# Patient Record
Sex: MALE | Race: OTHER | NOT HISPANIC OR LATINO | Employment: FULL TIME | URBAN - METROPOLITAN AREA
[De-identification: names, ages, dates, MRNs, and addresses within clinical notes are randomized per-mention and may not be internally consistent; named-entity substitution may affect disease eponyms.]

---

## 2023-08-14 ENCOUNTER — APPOINTMENT (OUTPATIENT)
Dept: RADIOLOGY | Facility: MEDICAL CENTER | Age: 33
End: 2023-08-14
Attending: PHYSICIAN ASSISTANT
Payer: COMMERCIAL

## 2023-08-14 ENCOUNTER — OFFICE VISIT (OUTPATIENT)
Dept: URGENT CARE | Facility: MEDICAL CENTER | Age: 33
End: 2023-08-14
Payer: COMMERCIAL

## 2023-08-14 VITALS
DIASTOLIC BLOOD PRESSURE: 92 MMHG | RESPIRATION RATE: 18 BRPM | OXYGEN SATURATION: 96 % | HEART RATE: 58 BPM | TEMPERATURE: 97.2 F | SYSTOLIC BLOOD PRESSURE: 122 MMHG

## 2023-08-14 DIAGNOSIS — S86.012A TORN ACHILLES TENDON, LEFT, INITIAL ENCOUNTER: ICD-10-CM

## 2023-08-14 DIAGNOSIS — S99.922A INJURY OF LEFT FOOT, INITIAL ENCOUNTER: ICD-10-CM

## 2023-08-14 DIAGNOSIS — S99.922A INJURY OF LEFT FOOT, INITIAL ENCOUNTER: Primary | ICD-10-CM

## 2023-08-14 PROCEDURE — 73610 X-RAY EXAM OF ANKLE: CPT

## 2023-08-14 PROCEDURE — 99203 OFFICE O/P NEW LOW 30 MIN: CPT | Performed by: FAMILY MEDICINE

## 2023-08-14 PROCEDURE — 73630 X-RAY EXAM OF FOOT: CPT

## 2023-08-14 RX ORDER — INDOMETHACIN 50 MG/1
CAPSULE ORAL
COMMUNITY
Start: 2023-07-04

## 2023-08-14 RX ORDER — ALLOPURINOL 100 MG/1
200 TABLET ORAL
COMMUNITY
Start: 2023-07-04

## 2023-08-14 NOTE — LETTER
August 14, 2023     Patient: Karissa Lincoln   YOB: 1990   Date of Visit: 8/14/2023       To Whom It May Concern: It is my medical opinion that Karissa Lincoln may return to work on 8/17/2023 . If you have any questions or concerns, please don't hesitate to call.          Sincerely,        Eli Krause MD    CC: No Recipients

## 2023-08-14 NOTE — PROGRESS NOTES
[facility]        NAME: Yaz Lundberg is a 34 y.o. male  : 1990    MRN: [id number]  DATE: 2023  TIME: 2:58 PM    Assessment and Plan   Injury of left foot, initial encounter [S99.922A]  1. Injury of left foot, initial encounter  XR ankle 3+ vw left    XR foot 3+ vw left      2. Torn Achilles tendon, left, initial encounter  Ambulatory Referral to Orthopedic Surgery            Patient Instructions       Follow up with PCP in 3-5 days. Proceed to  ER if symptoms worsen. Chief Complaint     Chief Complaint   Patient presents with   • Foot Pain     Pt C/O left ankle/foot pain that started Saturday after being tripped in basketball. History of Present Illness       74-year-old male here today with complaint of left foot/left ankle pain for the last 2 days. He informs the pain developed after he was playing basketball and landed tripping but denies spraining his ankle. He has some pain on weightbearing. He has no significant swelling of the posterior part of his heel calf and ankles. He has had previous ankle sprain but this felt different. Denies any numbness or weakness of the lower extremity. He came to urgent care ambulating with the assistance of crutches from his roommate. Pain is currently 10 out of 10 on weightbearing. Review of Systems   Review of Systems   Musculoskeletal: Positive for arthralgias and joint swelling. Neurological: Negative for numbness.          Current Medications       Current Outpatient Medications:   •  allopurinol (ZYLOPRIM) 100 mg tablet, Take 200 mg by mouth 2 (two) times a day (Patient not taking: Reported on 2023), Disp: , Rfl:   •  indomethacin (INDOCIN) 50 mg capsule, TAKE 1 CAPSULE BY MOUTH TWICE A DAY AS NEEDED FOR PAIN DURING GOUT FLAREUPS (Patient not taking: Reported on 2023), Disp: , Rfl:     Current Allergies     Allergies as of 2023   • (No Known Allergies)            The following portions of the patient's history were reviewed and updated as appropriate: allergies, current medications, past family history, past medical history, past social history, past surgical history and problem list.     No past medical history on file. No past surgical history on file. No family history on file. Medications have been verified. Objective   /92 (BP Location: Right arm, Patient Position: Sitting, Cuff Size: Large)   Pulse 58   Temp (!) 97.2 °F (36.2 °C) (Tympanic)   Resp 18   SpO2 96%   No LMP for male patient. Physical Exam     Physical Exam  Vitals and nursing note reviewed. Constitutional:       Appearance: Normal appearance. Musculoskeletal:         General: Swelling and tenderness present. Comments: Left lower extremity: Decreased range on plantarflexion and dorsiflexion. No pain on inversion or eversion of the left ankle. Significant swelling of the medial and lateral malleolus with ecchymosis bilaterally. Nontender. There is some tenderness over the distal Achilles. There is a depression where the Achilles attaches to the calcaneal bone. Chiu test-positive. Gait-antalgic. Neurological:      Mental Status: He is alert.

## 2023-08-22 ENCOUNTER — ANESTHESIA EVENT (OUTPATIENT)
Dept: PERIOP | Facility: AMBULARY SURGERY CENTER | Age: 33
End: 2023-08-22
Payer: COMMERCIAL

## 2023-08-22 VITALS
SYSTOLIC BLOOD PRESSURE: 130 MMHG | BODY MASS INDEX: 34.36 KG/M2 | DIASTOLIC BLOOD PRESSURE: 89 MMHG | WEIGHT: 240 LBS | HEIGHT: 70 IN | HEART RATE: 70 BPM

## 2023-08-22 DIAGNOSIS — S86.012A TORN ACHILLES TENDON, LEFT, INITIAL ENCOUNTER: Primary | ICD-10-CM

## 2023-08-22 PROCEDURE — 99244 OFF/OP CNSLTJ NEW/EST MOD 40: CPT | Performed by: ORTHOPAEDIC SURGERY

## 2023-08-22 RX ORDER — CHLORHEXIDINE GLUCONATE 0.12 MG/ML
15 RINSE ORAL ONCE
Status: CANCELLED | OUTPATIENT
Start: 2023-08-22 | End: 2023-08-22

## 2023-08-22 RX ORDER — IBUPROFEN 600 MG/1
TABLET ORAL EVERY 6 HOURS PRN
COMMUNITY

## 2023-08-22 RX ORDER — CHLORHEXIDINE GLUCONATE 4 G/100ML
SOLUTION TOPICAL DAILY PRN
Status: CANCELLED | OUTPATIENT
Start: 2023-08-22

## 2023-08-22 NOTE — PROGRESS NOTES
Ivone Altamirano M.D. Attending, Orthopaedic Surgery  Foot and Ankle  [facility]        ORTHOPAEDIC FOOT AND ANKLE CLINIC VISIT-ANKLE FRACTURE     Assessment:     Encounter Diagnosis   Name Primary? • Torn Achilles tendon, left, initial encounter Yes              Plan:   · The patient verbalized understanding of exam findings and treatment plan. We engaged in the shared decision-making process and treatment options were discussed at length with the patient. Surgical and conservative management discussed today along with risks and benefits. · The patient has a left achilles rupture sustained on 8/12/23  · Consent signed in clinic today  · We will plan for surgery at the earliest mutually convenient time. · Xarelto ordered for dvt ppx   · See back 3 weeks postop for suture removal and transition from splint to CAM boot    CONSENT FOR SOFT TISSUE PROCEDURES:   Patient understands that there is no guarantee that the surgery will relieve all of their pain and also understands that there may be a prolonged course of protected weight-bearing status required which will restrict them from driving and other activities as discussed at today's visit. Patient recognizes that there are risks with surgery including bleeding, numbness, nerve irritation, wound complications, infection, continued pain, anesthetic complications, death, failure of procedure and possible need for further surgery. The patient understands that there is no guarantee that this surgery will relieve all of His pain and symptoms. Patient understands that there is no guarantee that they will return to full function after the procedure. Patient has provided informed consent for the procedure.            History of Present Illness:   Chief Complaint:   Chief Complaint   Patient presents with   • Left Ankle - Pain     Patient was playing basketball on 8/12/23 when he collided with another person and fell     Brii Dalton is a 34 y.o. male who is being seen for  left achilles rupture that was sutained while playing basketball on 8/12. Patient states that he felt a pop when he was pushing off to catch the ball. He was seen by urgent care who placed him into a boot. Pain is localized at achilles with minimal radiating and described as sharp and severe. Patient denies numbness, tingling or radicular pain. Denies history of neuropathy. Patient does not smoke, does not have diabetes and does not take blood thinners. Patient denies family history of anesthesia complications and has not had any complications with anesthesia. Pain/symptom timing:  Worse during the day when active  Pain/symptom context:  Worse with activites and work  Pain/symptom modifying factors:  Rest makes better, activities make worse  Pain/symptom associated signs/symptoms: none    Prior treatment   · NSAIDsYes    · Injections No   · Bracing/Orthotics Yes   · Physical Therapy No     Orthopedic Surgical History:   See below    Past Medical, Surgical and Social History:  Past Medical History:  has no past medical history on file. Problem List: does not have a problem list on file. Past Surgical History:  has no past surgical history on file. Family History: family history is not on file. Social History:  has no history on file for tobacco use, alcohol use, and drug use. Current Medications: has a current medication list which includes the following prescription(s): allopurinol and indomethacin. Allergies: has No Known Allergies.      Review of Systems:  General- denies fever/chills  HEENT- denies hearing loss or sore throat  Eyes- denies eye pain or visual disturbances, denies red eyes  Respiratory- denies cough or SOB  Cardio- denies chest pain or palpitations  GI- denies abdominal pain  Endocrine- denies urinary frequency  Urinary- denies pain with urination  Musculoskeletal- Negative except noted above  Skin- denies rashes or wounds  Neurological- denies dizziness or headache  Psychiatric- denies anxiety or difficulty concentrating    Physical Exam:   /89   Pulse 70   Ht 5' 10" (1.778 m)   Wt 109 kg (240 lb)   BMI 34.44 kg/m²   General/Constitutional: No apparent distress: well-nourished and well developed. Eyes: normal ocular motion  Cardio: RRR, Normal S1S2, No m/r/g  Lymphatic: No appreciable lymphadenopathy  Respiratory: Non-labored breathing, CTA b/l no w/c/r  Vascular: No edema, swelling or tenderness, except as noted in detailed exam.  Integumentary: No impressive skin lesions present, except as noted in detailed exam.  Neuro: No ataxia or tremors noted  Psych: Normal mood and affect, oriented to person, place and time. Appropriate affect. Musculoskeletal: Normal, except as noted in detailed exam and in HPI. Examination    left    Gait Not assessed due to unstable ankle fracture   Musculoskeletal Tender to palpation at fracture site    Skin What can be seen in the splint is Normal.      Nails Normal    Range of Motion  Not assessed due to unstable ankle fracture    Stability Unstable    Muscle Strength N/A tibialis anterior  N/A gastrocnemius-soleus  N/A posterior tibialis  N/A peroneal/eversion strength  5/5 EHL  5/5 FHL    Neurologic Normal    Sensation  Intact to light touch throughout sural, saphenous, superficial peroneal, deep peroneal and medial/lateral plantar nerve distributions. [place]-Chris 5.07 filament (10g) testing  deferred. Cardiovascular Brisk capillary refill < 2 seconds,intact DP and PT pulses    Special Tests None      Imaging Studies:   3 views of the  right ankle were taken, reviewed and interpreted independently that demonstrate no acute osseous abnormalities. Reviewed by me personally.     Scribe Attestation    I,:  Deana Grande PA-C am acting as a scribe while in the presence of the attending physician.:       I,:  Raegan Mcclure MD personally performed the services described in this documentation    as scribed in my presence.:             Charliene Rife. Lachman, MD  Foot & Ankle Surgery   Department of [address] [place] [address]      I personally performed the service. Charliene Rife. Lachman, MD

## 2023-08-22 NOTE — H&P (VIEW-ONLY)
Jered Price M.D. Attending, Orthopaedic Surgery  Foot and Ankle  [facility]        ORTHOPAEDIC FOOT AND ANKLE CLINIC VISIT-ANKLE FRACTURE     Assessment:     Encounter Diagnosis   Name Primary? • Torn Achilles tendon, left, initial encounter Yes              Plan:   · The patient verbalized understanding of exam findings and treatment plan. We engaged in the shared decision-making process and treatment options were discussed at length with the patient. Surgical and conservative management discussed today along with risks and benefits. · The patient has a left achilles rupture sustained on 8/12/23  · Consent signed in clinic today  · We will plan for surgery at the earliest mutually convenient time. · Xarelto ordered for dvt ppx   · See back 3 weeks postop for suture removal and transition from splint to CAM boot    CONSENT FOR SOFT TISSUE PROCEDURES:   Patient understands that there is no guarantee that the surgery will relieve all of their pain and also understands that there may be a prolonged course of protected weight-bearing status required which will restrict them from driving and other activities as discussed at today's visit. Patient recognizes that there are risks with surgery including bleeding, numbness, nerve irritation, wound complications, infection, continued pain, anesthetic complications, death, failure of procedure and possible need for further surgery. The patient understands that there is no guarantee that this surgery will relieve all of His pain and symptoms. Patient understands that there is no guarantee that they will return to full function after the procedure. Patient has provided informed consent for the procedure.            History of Present Illness:   Chief Complaint:   Chief Complaint   Patient presents with   • Left Ankle - Pain     Patient was playing basketball on 8/12/23 when he collided with another person and fell     Swathi Tomy is a 34 y.o. male who is being seen for  left achilles rupture that was sutained while playing basketball on 8/12. Patient states that he felt a pop when he was pushing off to catch the ball. He was seen by urgent care who placed him into a boot. Pain is localized at achilles with minimal radiating and described as sharp and severe. Patient denies numbness, tingling or radicular pain. Denies history of neuropathy. Patient does not smoke, does not have diabetes and does not take blood thinners. Patient denies family history of anesthesia complications and has not had any complications with anesthesia. Pain/symptom timing:  Worse during the day when active  Pain/symptom context:  Worse with activites and work  Pain/symptom modifying factors:  Rest makes better, activities make worse  Pain/symptom associated signs/symptoms: none    Prior treatment   · NSAIDsYes    · Injections No   · Bracing/Orthotics Yes   · Physical Therapy No     Orthopedic Surgical History:   See below    Past Medical, Surgical and Social History:  Past Medical History:  has no past medical history on file. Problem List: does not have a problem list on file. Past Surgical History:  has no past surgical history on file. Family History: family history is not on file. Social History:  has no history on file for tobacco use, alcohol use, and drug use. Current Medications: has a current medication list which includes the following prescription(s): allopurinol and indomethacin. Allergies: has No Known Allergies.      Review of Systems:  General- denies fever/chills  HEENT- denies hearing loss or sore throat  Eyes- denies eye pain or visual disturbances, denies red eyes  Respiratory- denies cough or SOB  Cardio- denies chest pain or palpitations  GI- denies abdominal pain  Endocrine- denies urinary frequency  Urinary- denies pain with urination  Musculoskeletal- Negative except noted above  Skin- denies rashes or wounds  Neurological- denies dizziness or headache  Psychiatric- denies anxiety or difficulty concentrating    Physical Exam:   /89   Pulse 70   Ht 5' 10" (1.778 m)   Wt 109 kg (240 lb)   BMI 34.44 kg/m²   General/Constitutional: No apparent distress: well-nourished and well developed. Eyes: normal ocular motion  Cardio: RRR, Normal S1S2, No m/r/g  Lymphatic: No appreciable lymphadenopathy  Respiratory: Non-labored breathing, CTA b/l no w/c/r  Vascular: No edema, swelling or tenderness, except as noted in detailed exam.  Integumentary: No impressive skin lesions present, except as noted in detailed exam.  Neuro: No ataxia or tremors noted  Psych: Normal mood and affect, oriented to person, place and time. Appropriate affect. Musculoskeletal: Normal, except as noted in detailed exam and in HPI. Examination    left    Gait Not assessed due to unstable ankle fracture   Musculoskeletal Tender to palpation at fracture site    Skin What can be seen in the splint is Normal.      Nails Normal    Range of Motion  Not assessed due to unstable ankle fracture    Stability Unstable    Muscle Strength N/A tibialis anterior  N/A gastrocnemius-soleus  N/A posterior tibialis  N/A peroneal/eversion strength  5/5 EHL  5/5 FHL    Neurologic Normal    Sensation  Intact to light touch throughout sural, saphenous, superficial peroneal, deep peroneal and medial/lateral plantar nerve distributions. [place]-Chris 5.07 filament (10g) testing  deferred. Cardiovascular Brisk capillary refill < 2 seconds,intact DP and PT pulses    Special Tests None      Imaging Studies:   3 views of the  right ankle were taken, reviewed and interpreted independently that demonstrate no acute osseous abnormalities. Reviewed by me personally.     Scribe Attestation    I,:  Brigid Saint, PA-C am acting as a scribe while in the presence of the attending physician.:       I,:  Julianne Ferrer MD personally performed the services described in this documentation    as scribed in my presence.:             Derrick Ocampo. Lachman, MD  Foot & Ankle Surgery   Department of [address] [place] [address]      I personally performed the service. Vannie Frisk. Lachman, MD

## 2023-08-22 NOTE — PRE-PROCEDURE INSTRUCTIONS
Pre-Surgery Instructions:   Medication Instructions   • allopurinol (ZYLOPRIM) 100 mg tablet Take night before surgery   • ibuprofen (MOTRIN) 600 mg tablet Follow surgeons instructions   • indomethacin (INDOCIN) 50 mg capsule Uses PRN- DO NOT take day of surgery   • rivaroxaban (Xarelto) 10 mg tablet Post op only      Medication instructions for day surgery reviewed. Please use only a sip of water to take your instructed medications. Avoid all over the counter vitamins, supplements and NSAIDS starting today. Tylenol is ok to take as needed. You will receive a call one business day prior to surgery with an arrival time and hospital directions. If your surgery is scheduled on a Monday, the hospital will be calling you on the Friday prior to your surgery. If you have not heard from anyone by 8pm, please call the hospital supervisor through the hospital  at [phone]. Pablo Miguel [phone]). Do not eat or drink anything after midnight the night before your surgery, including candy, mints, lifesavers, or chewing gum. Do not drink alcohol 24hrs before your surgery. Try not to smoke at least 24hrs before your surgery. Follow the pre surgery showering instructions as listed in the [facility] Surgical Experience Booklet” or otherwise provided by your surgeon's office. Do not shave the surgical area 24 hours before surgery. Do not apply any lotions, creams, including makeup, cologne, deodorant, or perfumes after showering on the day of your surgery. No contact lenses, eye make-up, or artificial eyelashes. Remove nail polish, including gel polish, and any artificial, gel, or acrylic nails if possible. Remove all jewelry including rings and body piercing jewelry. Wear causal clothing that is easy to take on and off. Consider your type of surgery. Keep any valuables, jewelry, piercings at home. Please bring any specially ordered equipment (sling, braces) if indicated.     Arrange for a responsible person to drive you to and from the hospital on the day of your surgery. Visitor Guidelines discussed. Call the surgeon's office with any new illnesses, exposures, or additional questions prior to surgery. Please reference your [facility] Surgical Experience Booklet” for additional information to prepare for your upcoming surgery.

## 2023-08-22 NOTE — PATIENT INSTRUCTIONS
Shannon Elizondo M.D. Attending, [address] [place] [address] Office Phone: [phone] ? Fax: [phone]  [place] Office Phone: [phone] ? EQP:[phone]    : [place]     Surgery Coordinators [facility]: [facility], [address], [phone]  Surgery Coordinator [place]:  Mayela Jiang, [phone]                                                               [email or web address]   PRE-OPERATIVE AND POST-OPERATIVE INSTRUCTIONS    General Information:  Your surgery is with Dr. Josr Gonzalez. Dates can change (although rare) depending on emergencies. Typical post operative visits are at the following intervals:  3 weeks post surgery(except 1 week for bunions and wound monitoring), 6 weeks post surgery, 3 months post surgery, 6 months post surgery, and then on a yearly basis. However, this may change based on Dr. Kamini Gayle recommendation. #1 post-operative rule for foot/ankle surgery:  ONCE YOU ARE OUT OF YOUR CAST AND/OR REMOVABLE BOOT, SWELLING MAY PERSIST FOR MANY MONTHS. YOU MIGHT ALSO EXPERIENCE A BLUISH DISCOLORATION OF YOUR LEG. THIS IS NORMAL AND PART OF THE USUAL POSTOPERATIVE EXPERIENCE. SMOKING:  Smoking results in incomplete healing of fractures (broken bones) and joints that my have been fused. Smoking and nicotine also prevents the growth of bone into ankle replacements and bone healing. It also slows the healing of muscles and skin (soft tissue). Therefore, please do not have surgery if you continue to smoke. We reserve the right to cancel your surgery if we suspect that you are smoking. DO NOT use nicorette gum or other patches. Please find an alternative method to quit smoking before your surgery. Pre-Operative Information:  Surgery date and preoperative visits:   If you have medical problems, such as an abnormal EKG, history of BLOOD CLOT, ANEURYSM, and any other heart condition, please inform us so that we can get your medical clearance several weeks before the surgery. Please bring any important medical information, such as an EKG, chest x-ray, or echocardiogram, with you to ensure that your surgery will not be delayed. If needed, you will receive your preoperative appointments in the mail or by phone from our scheduling office. The location of the preoperative appointment will be given to you also. You may not eat after midnight the night before surgery. If you do, your surgery will be cancelled. You will receive a phone call from your surgery center the day before your surgery (if your surgery is on a Monday, you will get a call the Friday before). If you do not hear from someone by 4pm the day before your surgery, please call the Surgical coordinator (number above) to notify us. Start taking Vitamin D3 4000 units per day and Calcium 1200mg per day immediately. You will continue this until your 3 month post-op visit. These are over the counter and available at all pharmacies and supermarkets. FOR THOSE HAVING SURGERY AT [facility] e WILL NEED CRUTCHES OR A ROLLING WALKER AFTER SURGERY, ASK FOR A PRESCRIPTION FOR THIS FROM OUR OFFICE TODAY. THIS CANNOT BE HANDLED THE DAY OF SURGERY AS [facility] DOES NOT STOCK THESE. Because bacterial can often enter any defect in the skin, it is important to avoid any cuts before surgery. Any breaks in the skin on the leg will often result in your surgery being postponed. Please avoid going on a very long walk the day prior to surgery, or doing other activities that could lead to irritation of the skin, including yard work, extra athletic activity, or shaving. This could result in surgery cancellation. You MUST be fasting the day of your surgery. Therefore, please do not consume any foot or beverage after midnight the night before surgery.   The morning of surgery you may take your usual medications with a sip of water. It is important not to take anti-inflammatory medication like Ibuprofen, Motrin, Naproxen (Aleve), or Aspirin 7-10 days before surgery because they will make you bleed more than usual.  Vitamin, E, Plavix and Coumadin also have the same effect. Stop Aspirin and Vitamin E two weeks before surgery. YOUR MEDICAL DOCTOR SHOULD TELL YOU WHEN TO STOP COUMADIN OR PLAVIX. If your surgery involves any bone healing, please do not take anti-inflammatories for at least 6 weeks after surgery. This can impede bone healing (ibuprofen, Aleve, Relafen, iodine). Tylenol is fine to take. PREOPERATIVE BATHING INSTRUCTIONS:    Before your surgery, bathe with Hibiclens (4% Chlorhexidene) as instructed below. This skin cleanser will help reduce the bacteria on your skin before surgery. To avoid irritating your eyes, do not apply Hibiclens above the level of your neck. On the evening before AND the morning of surgery, bathe your entire body except the face and scalp, then rinse freely. DO NOT apply to your face or scalp, as Hibiclens can irritate your eyes. Purchasing information:   Hibiclens is available without a prescription at [facility]. ADDITIONAL INSTRUCTIONS:  PATIENTS HAVING FOOT/ANKLE SURGERY     In preparation for your upcoming surgery, we kindly request and advise the following:  Notify our office if you are taking any of the following:  Coumadin (warfarin):  Persantine (dipyridamole); Pletal (cilostazol); Plavix (clopidogrel); Ticlid (ticlopidine); Agrylin (anagrelide); Aggrenox (dipyridamole and aspirin) or other blood thinners,. In addition, stop taking Vitamin E and herbal supplements. Do not schedule any elective dental work for at least 6 months after surgery. If you had an ankle replacement, you will need to take antibiotics before any future dental procedures. Your dentist or our office can prescribe these for you.   1000mg of Amoxicillin 1 hour prior to any dental procedure is the recommended dosing. THREE RULES:    After surgery you will most likely be given the instructions “KEEP YOUR TOES ABOVE YOUR NOSE.”  This means that you MUST have your feet elevated higher than your heart. Keeping your toes above your nose helps to heal the muscles and skin (soft tissues) by reducing swelling in your leg. This position also helps to prevent infection, and is very important in avoiding deep venous thrombosis (blood clots). In order to keep the blood circulating in your legs and in order to avoid deep vein   thrombosis (blood clots), we ask patients to GET UP ONCE AN HOUR during the day. This means you should at least cross the room and come back. It does not mean you have to be up for long periods of time. In most cases we will not have people immediately put any weight on their operated part. This is important to prevent loosening of metal or other devices holding the bones together. It also prevents irritation of the soft tissues which can lead to prolonged healing. When we say get up once an hour, please walk, hop or move with an assisted device. This is important! Do not do any excessive walking during the first few days after surgery. Recovering from surgery is a full-time task for the patient. Postoperative care is important to avoid irritating the skin incision, which can lead to infection. Please do not plan activities or go out of town for several weeks after surgery. If you are unsure about your future activities, please schedule surgery only when you know it is acceptable for you. Scheduling surgery and then canceling the date, prevents other people from having surgery on that date as it takes time to line everything up effectively. If you cancel your surgery the week of your planned surgery, we reserve the right to cancel all future surgical procedures.     THE DAY OF SURGERY:    Arrival to the hospital or outpatient surgical center on time is imperative. If you arrive late, then your surgery will be cancelled. You MUST have a family member/friend bring you, stay with you throughout the DURATION of your surgery, and drive you home. You MUST be fasting the day of your surgery. Therefore, do not consume any food or beverage after midnight the night before surgery. At your pre-operative visit with the anesthesia staff, or during your phone screen, a nurse will instruct you what medications you will need to take the day of surgery. MAKE SURE THAT THE PHARMACY LISTED IN THE ELECTRONIC MEDICAL RECORD (EPIC) IS YOUR PREFERRED PHARMACY. For example, if you are staying with family or a friend, and will not be near your preferred pharmacy, YOU MUST, tell the nurses checking you in the day of surgery so that this can be changed in the system. If your prescriptions are sent to a pharmacy, this cannot be changed. AFTER YOUR SURGERY:  Bleeding through the bandage almost always occurs. Do not let this alarm you. Simply add more gauze or a towel, call us, and come in for a dressing change. If you think it is excessive, contact us immediately or go to the local emergency room. Do not get the bandage wet. Showering is possible with plastic protectors. Be very careful, as the bathroom can be wet and slippery. If you do get your dressing wet, it should be changed immediately. Please contact us. ONCE YOUR ARE OUT OF YOUR CAST AND/OR REMOVABLE BOOT, SWELLING MAY PERSIST FOR MANY MONTHS. YOU MIGHT ALSO EXPERIENCE A BLUISH DISCOLORATION OF YOUR LEG. THIS IS NORMAL AND PART OF THE USUAL POSTOPERATIVE EXPERIENCE. WEARING COMPRESSION HOSE (ELASTIC STOCKINGS) CAN HELP AVOID SOME OF THIS SWELLING. DRESSING:   The purpose of the surgical dressing is to keep your wound and the surgical site protected from the environment.   Most dressings contain splints, which help to hold your foot and ankle in a corrected position, and also allow the surgical site to heal properly. Dressings will remain in place and undisturbed until the first postop visit. If you have a drain in place, this will need to be removed in 1-3 days after surgery. The time for the drain to be pulled will be written on your discharge instruction sheet. CAST  INSTRUCTIONS:  You may or may not get a cast following surgery. If you do, pay close attention to the following:    After application of a splint or cast, it is very important to elevate your leg for 24 to 72 hours. The injured area should be elevated well above the heart. Remember “Toes above your Nose”. Rest and elevation greatly reduce pain and speed the healing process by minimizing early swelling. CALL YOUR DOCTORS OFFICE OR VISIT LOCATION EMERGENCY ROOM IF YOU HAVE ANY OF THE FOLLOWING:    Significant increased pain, which may be caused by swelling, and the feeling that the splint or cast is too tight  Numbness and tingling in your hand or foot, which may be caused by too much pressure on the nerves  Burning and stinging, which may be caused by too much pressure on the skin  Excessive swelling below the cast, which may mean the cast is slowing your blood circulation  Loss of active movement of toes, which request an urgent evaluation  Loss of “capillary refill”. Pinch the tip of toes and randa the skin. Release pressure and if the skin does not return pink then call the office immediately. DO NOT GET YOUR CAST WET. Bacteria thrive in moist dark areas. We do not want this. If your cast becomes wet, return to the office and we will apply another one. PAIN AFTER SURGERY:  Narcotic pain medication can and will depress your respiratory system if taken in excess. The goal of pain management with narcotics is to be comfortable not pain free. If you take enough narcotics to be pain free then you run the risk of stopping breathing. If this happens, call 911 immediately!   Pain in the heel is often caused by pressure from the weight of your foot on the bed. Make sure your heel is suspended off the bed by keeping a pillow underneath your calf not your knee. Medications: You will be given narcotic pain medication. Do NOT drive while taking narcotic medications. Medications such as Darvocet, Percocet, Vicoden or Tylenol #3, also contain acetaminophen (Tylenol). Do not take acetaminophen or Tylenol from home when taking theses medications. When you fill your prescription, you may ask the pharmacist if your pain medication has acetaminophen/Tylenol in it. It is okay to take Tylenol with Oxycontin/Oxycodone. Should you have pain after taking your prescription medication, ibuprophen (Motrin, Advil, and Alleve) is a common over the counter preparation and may often be taken with the prescription pain medication as long as you take them with food. These medications can irritate the stomach lining. Unless you are allergic to aspirin or currently taking a blood thinner, Dr. Isidra Young patients are requested to take one 325 mg aspirin every 12 hours until you are back to walking normally after surgery (This can be up to 6 weeks). Narcotic medications commonly cause nausea. Taking them with food will decrease this side effect. If you are having extreme nausea, please contact us for an alternative medication or for something that can be taken with this medication to decrease the nausea. Also, narcotic medications frequently cause constipation. An increase of fiber, fruits and vegetables in your diet may alleviate this problem, or if necessary, you may use an over-the-counter medication such as senekot, colace, or Fibercon for constipation problems. You should resume all medications you were taking prior to the surgery unless otherwise specified. Activity:   Because of your recent foot surgery, your activity level will decrease.  You will need to elevate your foot ABOVE the level of your heart for a minimum of four days. The length of time necessary for the swelling to go down, and for your wounds to heal properly depends greatly on your efforts here. Elevation is extremely important to avoid compromising the blood supply to your foot. Remember when your foot is down it will swell, which will increase pain and slow healing. Wiggle your toes frequently if possible. If you go home with a regional block, (a type of anesthesia) the foot and leg will be numb. Think of ways to get into your house and around the house until the block wears off. Keep in mind that it may be a legal issue if you drive while in a cast or splint, especially when the splint is on the right foot. You may call the [employer] of [employer] to schedule a road test if you have adaptive equipment applied to your car. The amount of weight you are allowed to bear on your foot will be written on your discharge sheet filled out at the time of surgery. The following is an explanation of the possibilities:       [id number] has the following policies when it comes to ELECTIVE surgery  No elective surgery requiring anesthesia until 7 weeks after a patient tested positive for COVID-19   No elective surgery requiring anesthesia until 3 months after a patient was hospitalized for COVID-19      Non-weight bearing: You are to put NO weight whatsoever on your foot. When using crutches or a walker, your foot should not touch the ground, except when you are standing. Then, it may rest on the ground. If you are to be non-weight bearing, and you are not compliant, you could compromise the surgery. Some of our patients have been requesting prescriptions for a roll-a-bout knee scooter. [employer] and other insurances have been denying these claims, and you may either have to rent one or pay out of pocket to purchase one.   THIS SHOULD BE PURCHASED PRIOR TO THE SURGERY AND YOU SHOULD BRING IT WITH YOU THE DAY OF THE SURGERY TO AIDE IN GETTING FROM THE CAR INTO THE HOUSE AFTER SURGERY.

## 2023-08-24 ENCOUNTER — ANESTHESIA (OUTPATIENT)
Dept: PERIOP | Facility: AMBULARY SURGERY CENTER | Age: 33
End: 2023-08-24
Payer: COMMERCIAL

## 2023-08-24 ENCOUNTER — HOSPITAL ENCOUNTER (OUTPATIENT)
Facility: AMBULARY SURGERY CENTER | Age: 33
Setting detail: OUTPATIENT SURGERY
Discharge: HOME | End: 2023-08-24
Attending: ORTHOPAEDIC SURGERY | Admitting: ORTHOPAEDIC SURGERY
Payer: COMMERCIAL

## 2023-08-24 VITALS
HEIGHT: 70 IN | OXYGEN SATURATION: 95 % | TEMPERATURE: 97 F | HEART RATE: 78 BPM | WEIGHT: 243 LBS | BODY MASS INDEX: 34.79 KG/M2 | RESPIRATION RATE: 16 BRPM | SYSTOLIC BLOOD PRESSURE: 133 MMHG | DIASTOLIC BLOOD PRESSURE: 87 MMHG

## 2023-08-24 DIAGNOSIS — S86.012A TORN ACHILLES TENDON, LEFT, INITIAL ENCOUNTER: Primary | ICD-10-CM

## 2023-08-24 PROCEDURE — NC001 PR NO CHARGE

## 2023-08-24 PROCEDURE — 27650 REPAIR ACHILLES TENDON: CPT | Performed by: ORTHOPAEDIC SURGERY

## 2023-08-24 PROCEDURE — C9290 INJ, BUPIVACAINE LIPOSOME: HCPCS | Performed by: ANESTHESIOLOGY

## 2023-08-24 PROCEDURE — C1713 ANCHOR/SCREW BN/BN,TIS/BN: HCPCS | Performed by: ORTHOPAEDIC SURGERY

## 2023-08-24 DEVICE — PARS SUTURE IMPLANT KIT W/ SUTURETAPE
Type: IMPLANTABLE DEVICE | Site: HEEL | Status: FUNCTIONAL
Brand: ARTHREX®

## 2023-08-24 RX ORDER — BUPIVACAINE HYDROCHLORIDE 5 MG/ML
INJECTION, SOLUTION EPIDURAL; INTRACAUDAL
Status: COMPLETED | OUTPATIENT
Start: 2023-08-24 | End: 2023-08-24

## 2023-08-24 RX ORDER — LABETALOL HYDROCHLORIDE 5 MG/ML
5 INJECTION, SOLUTION INTRAVENOUS
Status: DISCONTINUED | OUTPATIENT
Start: 2023-08-24 | End: 2023-08-24 | Stop reason: HOSPADM

## 2023-08-24 RX ORDER — MEPERIDINE HYDROCHLORIDE 25 MG/ML
12.5 INJECTION INTRAMUSCULAR; INTRAVENOUS; SUBCUTANEOUS ONCE
Status: DISCONTINUED | OUTPATIENT
Start: 2023-08-24 | End: 2023-08-24 | Stop reason: HOSPADM

## 2023-08-24 RX ORDER — LIDOCAINE HYDROCHLORIDE 10 MG/ML
INJECTION, SOLUTION EPIDURAL; INFILTRATION; INTRACAUDAL; PERINEURAL AS NEEDED
Status: DISCONTINUED | OUTPATIENT
Start: 2023-08-24 | End: 2023-08-24

## 2023-08-24 RX ORDER — ONDANSETRON 2 MG/ML
INJECTION INTRAMUSCULAR; INTRAVENOUS AS NEEDED
Status: DISCONTINUED | OUTPATIENT
Start: 2023-08-24 | End: 2023-08-24

## 2023-08-24 RX ORDER — HYDROMORPHONE HCL/PF 1 MG/ML
0.5 SYRINGE (ML) INJECTION
Status: DISCONTINUED | OUTPATIENT
Start: 2023-08-24 | End: 2023-08-24 | Stop reason: HOSPADM

## 2023-08-24 RX ORDER — FENTANYL CITRATE/PF 50 MCG/ML
25 SYRINGE (ML) INJECTION
Status: DISCONTINUED | OUTPATIENT
Start: 2023-08-24 | End: 2023-08-24 | Stop reason: HOSPADM

## 2023-08-24 RX ORDER — SODIUM CHLORIDE, SODIUM LACTATE, POTASSIUM CHLORIDE, CALCIUM CHLORIDE 600; 310; 30; 20 MG/100ML; MG/100ML; MG/100ML; MG/100ML
INJECTION, SOLUTION INTRAVENOUS CONTINUOUS PRN
Status: DISCONTINUED | OUTPATIENT
Start: 2023-08-24 | End: 2023-08-24

## 2023-08-24 RX ORDER — OXYCODONE HYDROCHLORIDE 5 MG/1
5 TABLET ORAL ONCE AS NEEDED
Status: DISCONTINUED | OUTPATIENT
Start: 2023-08-24 | End: 2023-08-24 | Stop reason: HOSPADM

## 2023-08-24 RX ORDER — ONDANSETRON 4 MG/1
4 TABLET, FILM COATED ORAL EVERY 8 HOURS PRN
Qty: 10 TABLET | Refills: 0 | Status: SHIPPED | OUTPATIENT
Start: 2023-08-24

## 2023-08-24 RX ORDER — PROPOFOL 10 MG/ML
INJECTION, EMULSION INTRAVENOUS AS NEEDED
Status: DISCONTINUED | OUTPATIENT
Start: 2023-08-24 | End: 2023-08-24

## 2023-08-24 RX ORDER — DEXAMETHASONE SODIUM PHOSPHATE 10 MG/ML
INJECTION, SOLUTION INTRAMUSCULAR; INTRAVENOUS AS NEEDED
Status: DISCONTINUED | OUTPATIENT
Start: 2023-08-24 | End: 2023-08-24

## 2023-08-24 RX ORDER — CHLORHEXIDINE GLUCONATE 4 G/100ML
SOLUTION TOPICAL DAILY PRN
Status: DISCONTINUED | OUTPATIENT
Start: 2023-08-24 | End: 2023-08-24 | Stop reason: HOSPADM

## 2023-08-24 RX ORDER — ONDANSETRON 2 MG/ML
4 INJECTION INTRAMUSCULAR; INTRAVENOUS ONCE AS NEEDED
Status: DISCONTINUED | OUTPATIENT
Start: 2023-08-24 | End: 2023-08-24 | Stop reason: HOSPADM

## 2023-08-24 RX ORDER — PROMETHAZINE HYDROCHLORIDE 25 MG/ML
12.5 INJECTION, SOLUTION INTRAMUSCULAR; INTRAVENOUS ONCE AS NEEDED
Status: DISCONTINUED | OUTPATIENT
Start: 2023-08-24 | End: 2023-08-24 | Stop reason: HOSPADM

## 2023-08-24 RX ORDER — SUCCINYLCHOLINE/SOD CL,ISO/PF 100 MG/5ML
SYRINGE (ML) INTRAVENOUS AS NEEDED
Status: DISCONTINUED | OUTPATIENT
Start: 2023-08-24 | End: 2023-08-24

## 2023-08-24 RX ORDER — OXYCODONE HYDROCHLORIDE 5 MG/1
5 TABLET ORAL EVERY 4 HOURS PRN
Qty: 30 TABLET | Refills: 0 | Status: SHIPPED | OUTPATIENT
Start: 2023-08-24

## 2023-08-24 RX ORDER — ALBUTEROL SULFATE 2.5 MG/3ML
2.5 SOLUTION RESPIRATORY (INHALATION) ONCE AS NEEDED
Status: DISCONTINUED | OUTPATIENT
Start: 2023-08-24 | End: 2023-08-24 | Stop reason: HOSPADM

## 2023-08-24 RX ORDER — FENTANYL CITRATE 50 UG/ML
INJECTION, SOLUTION INTRAMUSCULAR; INTRAVENOUS AS NEEDED
Status: DISCONTINUED | OUTPATIENT
Start: 2023-08-24 | End: 2023-08-24

## 2023-08-24 RX ORDER — MAGNESIUM HYDROXIDE 1200 MG/15ML
LIQUID ORAL AS NEEDED
Status: DISCONTINUED | OUTPATIENT
Start: 2023-08-24 | End: 2023-08-24 | Stop reason: HOSPADM

## 2023-08-24 RX ORDER — CHLORHEXIDINE GLUCONATE 0.12 MG/ML
15 RINSE ORAL ONCE
Status: DISCONTINUED | OUTPATIENT
Start: 2023-08-24 | End: 2023-08-24 | Stop reason: HOSPADM

## 2023-08-24 RX ORDER — SODIUM CHLORIDE, SODIUM LACTATE, POTASSIUM CHLORIDE, CALCIUM CHLORIDE 600; 310; 30; 20 MG/100ML; MG/100ML; MG/100ML; MG/100ML
125 INJECTION, SOLUTION INTRAVENOUS CONTINUOUS
Status: DISCONTINUED | OUTPATIENT
Start: 2023-08-24 | End: 2023-08-24 | Stop reason: HOSPADM

## 2023-08-24 RX ORDER — CEFAZOLIN SODIUM 2 G/50ML
2000 SOLUTION INTRAVENOUS ONCE
Status: COMPLETED | OUTPATIENT
Start: 2023-08-24 | End: 2023-08-24

## 2023-08-24 RX ORDER — ROCURONIUM BROMIDE 10 MG/ML
INJECTION, SOLUTION INTRAVENOUS AS NEEDED
Status: DISCONTINUED | OUTPATIENT
Start: 2023-08-24 | End: 2023-08-24

## 2023-08-24 RX ORDER — KETOROLAC TROMETHAMINE 30 MG/ML
INJECTION, SOLUTION INTRAMUSCULAR; INTRAVENOUS AS NEEDED
Status: DISCONTINUED | OUTPATIENT
Start: 2023-08-24 | End: 2023-08-24

## 2023-08-24 RX ORDER — VANCOMYCIN HYDROCHLORIDE 1 G/20ML
INJECTION, POWDER, LYOPHILIZED, FOR SOLUTION INTRAVENOUS AS NEEDED
Status: DISCONTINUED | OUTPATIENT
Start: 2023-08-24 | End: 2023-08-24 | Stop reason: HOSPADM

## 2023-08-24 RX ADMIN — ROCURONIUM BROMIDE 40 MG: 10 INJECTION, SOLUTION INTRAVENOUS at 13:26

## 2023-08-24 RX ADMIN — BUPIVACAINE 15 ML: 13.3 INJECTION, SUSPENSION, LIPOSOMAL INFILTRATION at 12:42

## 2023-08-24 RX ADMIN — SUGAMMADEX 200 MG: 100 INJECTION, SOLUTION INTRAVENOUS at 14:04

## 2023-08-24 RX ADMIN — CEFAZOLIN SODIUM 2000 MG: 2 SOLUTION INTRAVENOUS at 13:13

## 2023-08-24 RX ADMIN — BUPIVACAINE HYDROCHLORIDE 10 ML: 5 INJECTION, SOLUTION EPIDURAL; INTRACAUDAL at 12:47

## 2023-08-24 RX ADMIN — DEXAMETHASONE SODIUM PHOSPHATE 10 MG: 10 INJECTION, SOLUTION INTRAMUSCULAR; INTRAVENOUS at 13:24

## 2023-08-24 RX ADMIN — BUPIVACAINE HYDROCHLORIDE 15 ML: 5 INJECTION, SOLUTION EPIDURAL; INTRACAUDAL; PERINEURAL at 12:42

## 2023-08-24 RX ADMIN — SODIUM CHLORIDE, SODIUM LACTATE, POTASSIUM CHLORIDE, AND CALCIUM CHLORIDE: .6; .31; .03; .02 INJECTION, SOLUTION INTRAVENOUS at 12:45

## 2023-08-24 RX ADMIN — Medication 140 MG: at 13:17

## 2023-08-24 RX ADMIN — ONDANSETRON 4 MG: 2 INJECTION INTRAMUSCULAR; INTRAVENOUS at 13:24

## 2023-08-24 RX ADMIN — BUPIVACAINE 5 ML: 13.3 INJECTION, SUSPENSION, LIPOSOMAL INFILTRATION at 12:47

## 2023-08-24 RX ADMIN — KETOROLAC TROMETHAMINE 30 MG: 30 INJECTION, SOLUTION INTRAMUSCULAR; INTRAVENOUS at 14:00

## 2023-08-24 RX ADMIN — FENTANYL CITRATE 100 MCG: 50 INJECTION INTRAMUSCULAR; INTRAVENOUS at 13:14

## 2023-08-24 RX ADMIN — LIDOCAINE HYDROCHLORIDE 50 MG: 10 INJECTION, SOLUTION EPIDURAL; INFILTRATION; INTRACAUDAL; PERINEURAL at 13:17

## 2023-08-24 RX ADMIN — PROPOFOL 200 MG: 10 INJECTION, EMULSION INTRAVENOUS at 13:17

## 2023-08-24 NOTE — OP NOTE
OPERATIVE REPORT  PATIENT NAME: Tina Tovar    :  1990  MRN: [id number]  Pt Location: [facility] OR [facility] 06    SURGERY DATE: 2023    Surgeon(s) and Role:     * Abdulkadir Rodriguez MD - Primary     * Kiah Rocha PA-C - Gabe Rubio MD- assisting    Preop Diagnosis:  Torn Achilles tendon, left, initial encounter [S86.012A]    Post-Op Diagnosis Codes:     * Torn Achilles tendon, left, initial encounter [S86.012A]    Procedure(s):  Left - REPAIR TENDON ACHILLES    Specimen(s):  * No specimens in log *    Estimated Blood Loss:   Minimal    Drains:  * No LDAs found *    Anesthesia Type:   Choice    Operative Indications: Torn Achilles tendon, left, initial encounter [S86.012A]      Operative Findings:  Consistent with diagnosis    Complications:   None    Procedure and Technique:  1. Mini-open achilles tendon repair. 2. Partial fasciotomy deep posterior compartment fascia  3. Placement into short leg nonweightbearing plaster splint    The patient was placed prone and all bony prominences were padded. A thigh tourniquet was placed. An esmarch was used to exsanguinate the leg and the tourniquet was inflated to 250mm Hg. See anesthesia documentation for tourniquet time. Attention was turned to the central achilles. A longitudinal, 2 cm incision was made over the palpable gap. Sharp dissection was taken through the skin and bovie electrocautery was used to control hemostasis. Scissor dissection was used for the deep dissection. Next, a midline incision was made in the paratenon which was still intact which exposed the achilles tendon rupture. We exposed the proximal and distal segments. We used a freer to free any adhesions of the paratenon to the tendon. We then retracted the tendon and exposed the fascia of the posterior compartment. We released this fascia to allow blood supply from this compartment to aid in the supply of the achilles repair site.     We the placed the PARS jig proximally inside the paratenon while using counter-traction on the tendon with an clamp. We began passing the suture #1-5 sequentially, percutaneously through the tendon. After we passed the last suture, we pulled the PARS jig out of the wound and all 5 sutures followed on both sides. We did the same thing distally, passing all five sutures sequentially through the jig and then removing the jig with the sutures now inside the paratenon to prevent sural nerve entrapment or injury. We then used the described technique to lock the blue suture as described in the operations technique guide. We confirmed by pulling on the suture that each had great purchase in the substance of the tendon and then began tieing them to each corresponding suture. The foot was held in plantarflexion to allow good contact between the two ends of the tendon. We the used an 0-vicryl to oversew the tendon to give added strength to the repair. We confirmed restoration of resting equinus tone and had a return of the [place] test.    Copious irrigation was used. Vancomycin powder was used in the wound bed. Next, the paratenon was closed with a running 2-0 vicryl suture. 4-0 vicryl was used subcutaneously and 3-0 nylon was used for the skin. Sterile dressings were applied and the leg was splinted using a posterior-U. I was present for the entire procedure     I was present for the entire procedure.     Patient Disposition:  PACU         SIGNATURE: Araceils Potts MD  DATE: August 24, 2023  TIME: 1:18 PM

## 2023-08-24 NOTE — INTERVAL H&P NOTE
H&P reviewed. After examining the patient I find no changes in the patients condition since the H&P had been written. There were no vitals filed for this visit. Repair left achilles tendon.

## 2023-08-24 NOTE — DISCHARGE INSTR - AVS FIRST PAGE
Marley Belcher M.D. Attending, [address] [place] [address] Office Phone: [phone] ? Fax: [phone] [address] [facility] Office Phone: [phone] ? VYF:[phone]    : Emilie Casanova     Surgery Coordinators [facility]: Evitamaciej Norris, [phone]  Haylee Aparicio, [phone]  Surgery Coordinator [place]:  [place], [phone]                                                              [email or web address]   POST-OPERATIVE INSTRUCTIONS    General Information:  Typical post operative visits are at the following intervals:  2-3 weeks post surgery, 6 weeks post surgery, 3 months post surgery, 6 months post surgery, and then on a yearly basis. However, this may change based on Dr. Toro Kwon recommendation. #1 post-operative rule for foot/ankle surgery:  ONCE YOU ARE OUT OF YOUR CAST AND/OR REMOVABLE BOOT, SWELLING MAY PERSIST FOR MANY MONTHS. YOU MIGHT ALSO EXPERIENCE A BLUISH DISCOLORATION OF YOUR LEG. THIS IS NORMAL AND PART OF THE USUAL POSTOPERATIVE EXPERIENCE. DO NOT WAIT UNTIL YOUR BLOCK WEARS OFF TO TAKE YOUR PAIN MEDICATION. IT TAKES A FEW DOSES OF THE PAIN MEDICATION TO REACH A THERAPEUTIC LEVEL. TAKE A TABLET PROACTIVELY BEFORE YOU HAVE ANY PAIN AND AGAIN 4 HOURS LATER SO WHEN THE BLOCK WEARS OFF, YOU ARE NOT CAUGHT OFF GUARD. SMOKING:  Smoking results in incomplete healing of fractures (broken bones) and joints that my have been fused. Smoking and nicotine also prevents the growth of bone into ankle replacements and bone healing. It also slows the healing of muscles and skin (soft tissue). Therefore, please do not have surgery if you continue to smoke. We reserve the right to cancel your surgery if we suspect that you are smoking. DO NOT use nicorette gum or other patches.   Please find an alternative method to quit smoking before your surgery and do not restart after surgery to allow for healing. THREE RULES:    After surgery you will most likely be given the instructions “KEEP YOUR TOES ABOVE YOUR NOSE.”  This means that you MUST have your feet elevated higher than your heart. Keeping your toes above your nose helps to heal the muscles and skin (soft tissues) by reducing swelling in your leg. This position also helps to prevent infection, and is very important in avoiding deep venous thrombosis (blood clots). In order to keep the blood circulating in your legs and in order to avoid deep vein   thrombosis (blood clots), we ask patients to GET UP ONCE AN HOUR during the day. This means you should at least cross the room and come back. It does not mean you have to be up for long periods of time. In most cases we will not have people immediately put any weight on their operated part. This is important to prevent loosening of metal or other devices holding the bones together. It also prevents irritation of the soft tissues which can lead to prolonged healing. When we say get up once an hour, please walk, hop or move with an assisted device. This is important! Do not do any excessive walking during the first few days after surgery. Recovering from surgery is a full-time task for the patient. Postoperative care is important to avoid irritating the skin incision, which can lead to infection. Please do not plan activities or go out of town for several weeks after surgery. AFTER YOUR SURGERY:  Bleeding through the bandage almost always occurs. Do not let this alarm you. Simply overwrap with an ABD pad and Ace bandage (The nurses discharging your from the day of surgery will provide this.)   If you think it is excessive, you can come in early for a dressing change (at around 1 week instead of 3 weeks postop.)    Do not get the bandage wet. Showering is possible with plastic protectors. Be very careful, as the bathroom can be wet and slippery.   If you do get your dressing wet, it should be changed immediately. Please contact us. ONCE YOUR ARE OUT OF YOUR CAST AND/OR REMOVABLE BOOT, SWELLING MAY PERSIST FOR MANY MONTHS. THERE WILL ALSO BE A BLUISH DISCOLORATION OF YOUR LEG FOR MONTHS. THIS IS NORMAL AND PART OF THE USUAL POSTOPERATIVE EXPERIENCE. WEARING COMPRESSION HOSE (ELASTIC STOCKINGS) CAN HELP AVOID SOME OF THIS SWELLING. Ice the area 20 minutes every hour once the nerve block wears off. If you are in a cast or a splint, you may need to leave the ice on longer than 20 minutes in order to feel any benefits. DRESSING:   The purpose of the surgical dressing is to keep your wound and the surgical site protected from the environment. Most dressings contain splints, which help to hold your foot and ankle in a corrected position, and also allow the surgical site to heal properly. If you have a drain in place, this will need to be removed in 1 day after surgery. The time for the drain to be pulled will be written on your discharge instruction sheet. CAST  INSTRUCTIONS:  You may or may not get a cast following surgery. If you do, pay close attention to the following:    After application of a splint or cast, it is very important to elevate your leg for 24 to 72 hours. The injured area should be elevated well above the heart. Remember “Toes above your Nose”. Rest and elevation greatly reduce pain and speed the healing process by minimizing early swelling.     CALL YOUR DOCTORS OFFICE OR VISIT LOCATION EMERGENCY ROOM IF YOU HAVE ANY OF THE FOLLOWING:    Significant increased pain, which may be caused by swelling (Strict elevation will alleviate this)  Numbness and tingling in your hand or foot, which may be caused by too much pressure on the nerves (There is always some numbness after surgery due to nerve blocks)  Burning and stinging, which may be caused by too much pressure on the skin  Excessive swelling below the cast, which may mean the cast is slowing your blood circulation  Loss of active movement of toes, which request an urgent evaluation  Loss of “capillary refill”. Pinch the tip of toes and randa the skin. Release pressure and if the skin does not return pink then call the office immediately. DO NOT GET YOUR CAST WET. Bacteria thrive in moist dark areas. We do not want this. If your cast becomes wet, return to the office and we will apply another one. PAIN AFTER SURGERY:  Narcotic pain medication can and will depress your respiratory system if taken in excess. The goal of pain management with narcotics is to be comfortable not pain free. If you take enough narcotics to be pain free then you run the risk of stopping breathing. If this happens, call 911 immediately! Pain in the heel is often  caused by pressure from the weight of your foot on the bed. Make sure your heel is suspended off the bed by keeping a pillow underneath your calf not your knee. Medications: You will be given narcotic pain medication. Do NOT drive while taking narcotic medications. Medications such as Darvocet, Percocet, Vicoden or Tylenol #3, also contain acetaminophen (Tylenol). Do not take acetaminophen or Tylenol from home when taking theses medications. When you fill your prescription, you may ask the pharmacist if your pain medication has acetaminophen/Tylenol in it. It is okay to take Tylenol with Oxycontin/Oxycodone. Start Xarelto for blood clot prevention  Narcotic medications commonly cause nausea. Taking them with food will decrease this side effect. If you are having extreme nausea, please contact us for an alternative medication or for something that can be taken with this medication to decrease the nausea. Also, narcotic medications frequently cause constipation.  An increase of fiber, fruits and vegetables in your diet may alleviate this problem, or if necessary, you may use an over-the-counter medication such as senekot, colace, or Jessica Brown for constipation problems. You should resume all medications you were taking prior to the surgery unless otherwise specified. If you had fracture surgery, bony surgery like an osteotomy or fusion, or a surgery that requires bone healing, you are advised to take Vitamin D and Calcium to improve healing potential.  Vitamin D3 4000 units/day and Calcium 1200mg/day. These are over the counter medications so please pick them up at the pharmacy when you are picking up your prescriptions. Activity:   Because of your recent foot surgery, your activity level will decrease. You will need to elevate your foot ABOVE the level of your heart for a minimum of four days. The length of time necessary for the swelling to go down, and for your wounds to heal properly depends greatly on your efforts here. Elevation is extremely important to avoid compromising the blood supply to your foot. Remember when your foot is down it will swell, which will increase pain and slow healing. Wiggle your toes frequently if possible. If you go home with a regional block, (a type of anesthesia) the foot and leg will be numb. Think of ways to get into your house and around the house until the block wears off. Keep in mind that it may be a legal issue if you drive while in a cast or splint, especially when the splint is on the right foot. You may call the Department of Motor Vehicles to schedule a road test if you have adaptive equipment applied to your car. The amount of weight you are allowed to bear on your foot will be written on your discharge sheet filled out at the time of surgery. The following is an explanation of the possibilities:     Non-weight bearing: You are to put NO weight whatsoever on your foot. When using crutches or a walker, your foot should not touch the ground, except when you are standing. Then, it may rest on the ground.  If you are to be non-weight bearing, and you are not compliant, you could compromise the surgery. Some of our patients have been requesting prescriptions for a roll-a-bout knee scooter. [facility] and other insurances have been denying these claims, and you may either have to rent one or pay out of pocket to purchase one.

## 2023-08-24 NOTE — ANESTHESIA PREPROCEDURE EVALUATION
Procedure:  REPAIR TENDON ACHILLES (Left: Foot)    Relevant Problems   No relevant active problems        Physical Exam    Airway    Mallampati score: I  TM Distance: >3 FB  Neck ROM: full     Dental   No notable dental hx     Cardiovascular      Pulmonary      Other Findings        Anesthesia Plan  ASA Score- 2     Anesthesia Type- general with ASA Monitors. Additional Monitors:   Airway Plan: ETT. Comment: Patient seen and examined. History reviewed. Patient to be done under general anesthesia with LMA and routine monitors. Popliteal nerve block and adductor canal block to be performed preoperatively for post-op pain. Risks discussed with the patient. Consent obtained. .       Plan Factors-Exercise tolerance (METS): >4 METS. Chart reviewed. Patient summary reviewed. Induction- intravenous. Postoperative Plan- Plan for postoperative opioid use. Planned trial extubation    Informed Consent- Anesthetic plan and risks discussed with patient. I personally reviewed this patient with the CRNA. Discussed and agreed on the Anesthesia Plan with the CRNA. iLliana Ely

## 2023-08-24 NOTE — ANESTHESIA PROCEDURE NOTES
Peripheral Block    Patient location during procedure: holding area  Start time: 8/24/2023 12:40 PM  Reason for block: at surgeon's request and post-op pain management  Staffing  Performed by: Christie Marquez MD  Authorized by: Christie Marquez MD    Preanesthetic Checklist  Completed: patient identified, IV checked, site marked, risks and benefits discussed, surgical consent, monitors and equipment checked, pre-op evaluation and timeout performed  Peripheral Block  Patient position: right lateral  Prep: ChloraPrep  Patient monitoring: frequent blood pressure checks, continuous pulse oximetry and heart rate  Block type: Popliteal  Laterality: left  Injection technique: single-shot  Procedures: ultrasound guided, Ultrasound guidance required for the procedure to increase accuracy and safety of medication placement and decrease risk of complications.   Ultrasound permanent image savedbupivacaine (PF) (MARCAINE) 0.5 % injection 20 mL - Perineural   15 mL - 8/24/2023 12:42:00 PM  Needle  Needle type: Stimuplex   Needle gauge: 20 G  Needle length: 4 in  Needle localization: anatomical landmarks and ultrasound guidance  Needle insertion depth: 4 cm  Assessment  Injection assessment: incremental injection, frequent aspiration, injected with ease, negative aspiration, negative for heart rate change, no paresthesia on injection, no symptoms of intraneural/intravenous injection and needle tip visualized at all times  Paresthesia pain: none  Post-procedure:  site cleaned  patient tolerated the procedure well with no immediate complications

## 2023-08-24 NOTE — ANESTHESIA PROCEDURE NOTES
Peripheral Block    Patient location during procedure: holding area  Start time: 8/24/2023 12:43 PM  Reason for block: at surgeon's request and post-op pain management  Staffing  Performed by: Royal Monzon MD  Authorized by: Royal Monzon MD    Preanesthetic Checklist  Completed: patient identified, IV checked, site marked, risks and benefits discussed, surgical consent, monitors and equipment checked, pre-op evaluation and timeout performed  Peripheral Block  Prep: ChloraPrep  Patient monitoring: frequent blood pressure checks, continuous pulse oximetry and heart rate  Block type: Adductor Canal  Laterality: left  Injection technique: single-shot  Procedures: ultrasound guided, Ultrasound guidance required for the procedure to increase accuracy and safety of medication placement and decrease risk of complications.   Ultrasound permanent image savedbupivacaine (PF) (MARCAINE) 0.5 % injection 20 mL - Perineural   10 mL - 8/24/2023 12:47:00 PM  Needle  Needle type: Stimuplex   Needle gauge: 20 G  Needle length: 4 in  Needle localization: anatomical landmarks and ultrasound guidance  Needle insertion depth: 5 cm  Assessment  Injection assessment: incremental injection, frequent aspiration, injected with ease, negative aspiration, negative for heart rate change, no paresthesia on injection, no symptoms of intraneural/intravenous injection and needle tip visualized at all times  Paresthesia pain: none  Post-procedure:  site cleaned  patient tolerated the procedure well with no immediate complications

## 2023-08-24 NOTE — ANESTHESIA POSTPROCEDURE EVALUATION
Post-Op Assessment Note    CV Status:  Stable  Pain Score: 0    Pain management: adequate     Mental Status:  Alert and awake   Hydration Status:  Euvolemic   PONV Controlled:  Controlled   Airway Patency:  Patent   Two or more mitigation strategies used for obstructive sleep apnea   Post Op Vitals Reviewed: Yes      Staff: CRNA         No notable events documented.     BP   156/74   Temp   97.6   Pulse 83   Resp 14   SpO2 98

## 2023-09-13 ENCOUNTER — OFFICE VISIT (OUTPATIENT)
Dept: OBGYN CLINIC | Facility: CLINIC | Age: 33
End: 2023-09-13

## 2023-09-13 VITALS — HEIGHT: 70 IN | BODY MASS INDEX: 34.79 KG/M2 | WEIGHT: 243 LBS

## 2023-09-13 DIAGNOSIS — S86.012A TORN ACHILLES TENDON, LEFT, INITIAL ENCOUNTER: Primary | ICD-10-CM

## 2023-09-13 PROCEDURE — 99024 POSTOP FOLLOW-UP VISIT: CPT | Performed by: ORTHOPAEDIC SURGERY

## 2023-09-13 NOTE — PROGRESS NOTES
Elenita Sims M.D. Attending, Orthopaedic Surgery  Foot and Ankle  [facility]      ORTHOPAEDIC FOOT AND ANKLE POST-OP VISIT     Procedure:      Left achilles rupture repair       Date of surgery:   8/22/23      PLAN  1. Weightbearing Status - WBAT operative extremity with heel wedges  2. DVT prophylaxis - Xarelto 10mg Daily  3. Begin PT/HEP as directed  4. Pain control - OTC pain medication  5. RTC in 3 week(s)  6. Xrays needed next visit - No    History of Present Illness:   Chief Complaint:   Chief Complaint   Patient presents with   • Left Ankle - Post-op     Lucero Ibarra is a 34 y.o. male who is being seen for 3 week post-operative visit for the above procedure. Pain is well controlled and the patient has successfully transitioned to OTC pain medicines. he is taking Xarelto 10mg Daily for DVT prophylaxis. Patient has been NWB in a Splint in plantarflexion. Review of Systems:  General- denies fever/chills  Respiratory- denies cough or SOB  Cardio- denies chest pain or palpitations  GI- denies abdominal pain  Musculoskeletal- Negative except noted above  Skin- denies rashes or wounds    Physical Exam:   Ht 5' 10" (1.778 m)   Wt 110 kg (243 lb)   BMI 34.87 kg/m²   General/Constitutional: No apparent distress: well-nourished and well developed. Eyes: normal ocular motion  Lymphatic: No appreciable lymphadenopathy  Respiratory: Non-labored breathing  Vascular: No edema, swelling or tenderness, except as noted in detailed exam.  Integumentary: No impressive skin lesions present, except as noted in detailed exam.  Neuro: No ataxia or tremors noted  Psych: Normal mood and affect, oriented to person, place and time. Appropriate affect. Musculoskeletal: Normal, except as noted in detailed exam and in HPI.     Examination    left        Incision Clean, dry, intact  Sutures Removed this visit    Ecchymosis none    Swelling mild    Sensation Intact to light touch throughout sural, saphenous, superficial peroneal, deep peroneal and medial/lateral plantar nerve distributions. [place]-Chris 5.07 filament (10g) testing deferred. Cardiovascular Brisk capillary refill < 2 seconds,intact DP and PT pulses    Special Tests None      Imaging Studies:   No new images      Scribe Attestation    I,:  Rory Gtz PA-C am acting as a scribe while in the presence of the attending physician.:       I,:  Rom Lincoln MD personally performed the services described in this documentation    as scribed in my presence.:              Carolee Christian. Lachman, MD  Foot & Ankle Surgery   Department of [address] [place] [address]      I personally performed the service. Carolee Christian. Lachman, MD

## 2023-09-13 NOTE — PATIENT INSTRUCTIONS
Continue aspirin/lovenox/xarelto for blood clot prevention  May shower, do not soak in a tub/pool/ocean/etc for another 4 weeks. Begin PT  Scar massage- pea sized amount of lotion, massage into scar for 5 minutes each day. Compression stocking (Knee high, 20-30mm Hg) to be worn at all times while awake. Wear the boot at all times except when showering and in PT, even to sleep at night. Achilles Rupture - Rehab Protocol     OVERVIEW:  Week 0: Surgery  Avoid prolonged dependent position (keep foot elevated)  Week 3: CAM walker boot with 3 heel wedges, weightbearing as tolerated. remove one wedge each week (so down to 2 wedges at week 5, 1 wedge at week 6, 0 wedges at week 7). Begin PT     Week 8: continue PT, transition to shoe with heel lift, wean off crutches  PT 2-3 times/week and home exercises daily  Progress with PT over ~4 months  Week 12: wean off of shoe lift  Week 20 / 5 months: gentle jog  Week 24 / 6 months:  Gradual return to sports as tolerated     DETAILED PHYSICAL THERAPY PROTOCOL:  Initial Phase  Begin gentle ROM, edema control, and progress to gentle strengthening as tolerated.   No dorsiflexion past neutral.     Phase I: weeks 6 - 10  Goals:  Normalize gait  Progress range of motion  Normalize dorsiflexion, inversion, and eversion ankle strength 5/5  Treatment Recommendations:  Gait training, wean off crutches/cane when gait non-antalgic  Heel lift in shoe to assist non-apprehensive and normalized gait  AROM dorsiflexion/plantarflexion/inversion/eversion  Proprioception training  Isometrics/isotonics: inversion/eversion  Sitting heel rise  PREs plantarflexion/dorsiflexion with knee flexed to 90; after 2 weeks, progress to PREs with knee extended to 0  Leg press  Bike  Alphabet drawing  Retro treadmill  Forward step up program  Underwater treadmill system for gait training  Precautions:  Avoid passive heel cord stretching  Minimum Criteria for Advancement:  Normal gait pattern  Manual muscle grade test of 5/5 for dorsiflexion, inversion, and eversion     Phase II: weeks 10 - 20  Goals:  Restore full functional range of motion  Normalize plantarflexion strength 5/5  Normalize balance  Return to functional activities without pain  Ability to descend stairs  Treatment Recommendations:  Submaximal sport-specific skill development  Proprioception training: BAPS, prop board, foam rollers, trampoline, [employer]  Isometrics/isotonics: inversion/eversion  Isokinetic plantarflexion/dorsiflexion  Standing heel rise  Aggressive PREs plantarflexion  Progress proximal strengthening (PREs)  Jamilah Chapman Versaclimber  Forward step down program  Running in underwater treadmill system  Precautions:  Avoid pain with therapeutic exercise and functional activities  Avoid high loading the Achilles tendon (i.e. aggressive stretching in dorsiflexion with body weight or jumping)  Minimum Criteria for Advancement:  No apprehension with activities of daily living  Normal flexibility  Adequate strength base shown by ability to perform ten unilateral heel raises  Ability to descend stairs reciprocally  Symmetrical lower extremity balance     Phase III: weeks 20 - 28  Goals:  Demonstrate ability to run forward on a treadmill symptom-free  Average peak torque of 75% with isokinetic testing  Maximize strength and flexibility as to meet all demands of ADLs  Return to functional activity without limitation  Higher level of dynamic activity with lack of apprehension with sport-specific movements  Treatment Recommendations:  Start forward treadmill running  Isokinetic testing and training  Continue lower extremity strengthening and flexibility program  Advance proprioception training with perturbation  Light plyometric training (bilateral jumping activities)  Continue aggressive plantarflexion PREs (emphasize eccentric activity)  Submaximal sport specific skill development drills  Progress Jamilah chapman Versaclimber  Continue to progress proximal strengthening of lower extremities (PREs)  Precautions:  No apprehension or pain with dynamic activity  Avoid running or sport activity until adequate strength and flexibility is achieved  Minimum Criteria for Advancement:  Pain free running  Average peak torque of isokinetic test = 75% of non-involved  Normal strength (5/5 throughout ankle)  Sports-specific drills with zero apprehension     Phase IV: Return to Sport (weeks 28 - one year)  Goals:  Lack of apprehension with sports activity  Maximize strength and flexibility as to meet demands of individual's sport activity  Treatment Recommendations:  Advanced functional exercises and agility exercises  Plyometrics  Sport-specific exercises  Isokinetic testing  Functional test assessment (such as vertical jump test)  Precautions:  Avoid pain with therapeutic, functional, and sport activity  Avoid full sport activity until adequate strength and flexibility  Criteria for Discharge:  Flexibility and strength to accepted levels for sports performance  Lack of apprehension with sport-specific movements  85% limb symmetry with vertical jump test  85% limb symmetry for average peak isokinetic torque (PF/DF/inv/ev)  Independent performance of gym/home exercise program

## 2023-09-27 ENCOUNTER — EVALUATION (OUTPATIENT)
Dept: PHYSICAL THERAPY | Facility: MEDICAL CENTER | Age: 33
End: 2023-09-27
Attending: ORTHOPAEDIC SURGERY
Payer: COMMERCIAL

## 2023-09-27 DIAGNOSIS — S86.012A TORN ACHILLES TENDON, LEFT, INITIAL ENCOUNTER: ICD-10-CM

## 2023-09-27 PROCEDURE — 97161 PT EVAL LOW COMPLEX 20 MIN: CPT | Performed by: PHYSICAL THERAPIST

## 2023-09-27 PROCEDURE — 97110 THERAPEUTIC EXERCISES: CPT | Performed by: PHYSICAL THERAPIST

## 2023-09-27 NOTE — PROGRESS NOTES
PT Evaluation     Today's date: 2023  Patient name: Loraine Mejias  : 1990  MRN: [id number]  Referring provider: Rodolfo Pham MD  Dx:   Encounter Diagnosis     ICD-10-CM    1. Torn Achilles tendon, left, initial encounter  S86.012A Ambulatory Referral to Physical Therapy                     Assessment  Assessment details: Loraine Mejias is a 34 y.o. male who presents to physical therapy 5 weeks s/p left achilles tendon repair . [place] presents with pain, abnormal gait, and limitations in range of motion, strength, joint mobility, flexibility, and functional ability. The current limitations are affecting Pj's ability to function at prior level. He will benefit from skilled physical therapy to address the current impairments and functional limitations to enable him to return to daily activities at maximal level. Thank you for the referral.    Impairments: abnormal gait, abnormal or restricted ROM, activity intolerance, impaired balance, impaired physical strength, lacks appropriate home exercise program, weight-bearing intolerance and poor posture     Symptom irritability: lowUnderstanding of Dx/Px/POC: good   Prognosis: good    Goals  STG  Patient will decrease pain at worst to 5/10  Patient will d/c cam boot  Patient will demonstrate L ankle AROM WFL  Patient will d/c crutches  Patient will be independent with basic HEP    LTG  Patient will decrease pain at worst to 1/10  Patient will demonstrate left ankle strength 5/5 in all planes of motion  Patient will report ability to ascend/descend steps in a reciprocal pattern.   Patient will be independent with comprehensive HEP    Plan  Patient would benefit from: skilled physical therapy  Planned modality interventions: cryotherapy and thermotherapy: hydrocollator packs  Planned therapy interventions: manual therapy, neuromuscular re-education, patient education, therapeutic activities, therapeutic exercise, therapeutic training and home exercise program  Frequency: 2x week  Duration in weeks: 8  Treatment plan discussed with: patient        Subjective Evaluation    History of Present Illness  Mechanism of injury: Johnny Medellin is a 34 y.o. male presenting to physical therapy on 23 5 weeks s/p left achilles tendon repair. He mentions overall things have been going well. He mentions bathing and completing ADLs are challenging and requires compensations such as sitting down to get dressed. He mentions he was put in the CAM boot 2 weeks ago and has been using his crutches or scooter to get around. 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 Patient Goals  Patient goals for therapy: decreased pain, independence with ADLs/IADLs, increased strength, return to sport/leisure activities, increased motion and improved balance  Patient goal: be able to walk normally and hunt. Pain  Current pain ratin  At best pain ratin  At worst pain ratin  Location: L achillies   Quality: dull ache  Relieving factors: rest and ice    Social Support  Steps to enter house: yes  Stairs in house: no     Employment status: working  Treatments  No previous or current treatments        Objective    Observation: incision clean dry intact no signs of infection. Palpation: no tenderness noted upon examination    Circumference:  Left: 27cm, Right: 25cm. L Ankle AROM:  Dorsiflexion: -7 degrees  Plantarflexion: 30 degrees 60  Inversion: 10 degrees  Eversion: 8 degrees    R Ankle AROM:  Dorsiflexion: 12 degrees  Plantarflexion: 45 degrees  Inversion: 30 degrees  Eversion: 15 degrees    Ankle Strength   Right:  Dorsiflexion: 5/5   Inversion: 5/5   Eversion: 5/5    Left:  Not tested at initial evaluation. Flexibility: tightness noted bilateral hamstrings. Balance: not tested, weightbearing restrictions. Gait: patient ambulates on scooter with CAM boot on LLE. Precautions: DOS 23, protocol attached to chart.  NO PASSIVE DF, NO DF PAST NEUTRAL.     Manuals 9/27            Ankle PROM (IV,EV,PF)                                                    Neuro Re-Ed                                                                                                        Ther Ex             Ankle Pump (not past 0*DF) x20            Ankle IV/EV x20            SLR  2x10                                                                             Ther Activity                                       Gait Training                                       Modalities

## 2023-10-02 ENCOUNTER — OFFICE VISIT (OUTPATIENT)
Dept: PHYSICAL THERAPY | Facility: MEDICAL CENTER | Age: 33
End: 2023-10-02
Attending: ORTHOPAEDIC SURGERY
Payer: COMMERCIAL

## 2023-10-02 DIAGNOSIS — S86.012A TORN ACHILLES TENDON, LEFT, INITIAL ENCOUNTER: Primary | ICD-10-CM

## 2023-10-02 PROCEDURE — 97140 MANUAL THERAPY 1/> REGIONS: CPT

## 2023-10-02 PROCEDURE — 97110 THERAPEUTIC EXERCISES: CPT

## 2023-10-02 NOTE — PROGRESS NOTES
Daily Note     Today's date: 10/2/2023  Patient name: Genet Ritchie  : 1990  MRN: [id number]  Referring provider: Devi Allen MD  Dx:   Encounter Diagnosis     ICD-10-CM    1. Torn Achilles tendon, left, initial encounter  S86.012A                      Subjective: Pt reports that he's making good progress. Objective: See treatment diary below      Assessment: Tolerated treatment well. Patient is progressing appropriately. Pt would benefit from continued PT. Plan: Continue per plan of care. Precautions: DOS 23, protocol attached to chart. NO PASSIVE DF, NO DF PAST NEUTRAL.     Manuals 9/27 10/2           Ankle PROM (IV,EV,PF)                                                    Neuro Re-Ed                                                                                                        Ther Ex             Ankle Pump (not past 0*DF) x20 x30           Ankle IV/EV x20 x30           SLR  2x10 3x10                                                                            Ther Activity                                       Gait Training                                       Modalities

## 2023-10-06 ENCOUNTER — OFFICE VISIT (OUTPATIENT)
Dept: OBGYN CLINIC | Facility: CLINIC | Age: 33
End: 2023-10-06

## 2023-10-06 VITALS
BODY MASS INDEX: 34.36 KG/M2 | DIASTOLIC BLOOD PRESSURE: 80 MMHG | SYSTOLIC BLOOD PRESSURE: 118 MMHG | HEIGHT: 70 IN | WEIGHT: 240 LBS

## 2023-10-06 DIAGNOSIS — S86.012A TORN ACHILLES TENDON, LEFT, INITIAL ENCOUNTER: Primary | ICD-10-CM

## 2023-10-06 PROCEDURE — 99024 POSTOP FOLLOW-UP VISIT: CPT | Performed by: ORTHOPAEDIC SURGERY

## 2023-10-06 NOTE — PATIENT INSTRUCTIONS
2 weeks of weightbearing as tolerated in the CAM boot. You may begin weaning your boot and transitioning to a sneaker (10/20). It is important to do this gradually to avoid aggravating the healing process. 10/20, you may come out of the boot into a sneaker for 2 hours. 2. 10/21, you may come out of the boot into a sneaker for 4 hours,  3. The next day, you may come out of the boot into a sneaker for 6 hours. 4. Continue this (adding 2 hours per day) as you tolerate. For example, if you do 6 hours out of the boot into a sneaker and your foot swells more than usual at night and it is difficult to control the discomfort, do not advance to 8 hours the next day, stay at 6 hours until you are able to tolerate it. It is essential to follow this protocol and not modify this. No matter when you begin weaning the boot, it will be difficult and there will be swelling and soreness. If you spend more time than is recommended in the boot, this process becomes longer and more painful. Elevation, Ice and tylenol and staying off of it at night will be important to aide in this transition out of the boot. Swelling and soreness are normal as you begin to do more with the injured leg. May DC aspirin/xarelto/lovenox, no longer needed. May shower  Continue PT  Scar massage- pea sized amount of lotion, massage into scar for 5 minutes each day. Compression stocking (Knee high, 20-30mm Hg) to be worn at all times while awake. Achilles Rupture - Rehab Protocol     OVERVIEW:  Week 0: Surgery  Avoid prolonged dependent position (keep foot elevated)  Week 3: CAM walker boot with 3 heel wedges, weightbearing as tolerated. remove one wedge each week (so down to 2 wedges at week 5, 1 wedge at week 6, 0 wedges at week 7). Begin PT  Week 6- Foot flat to the ground in the boot, begin phase 1 of PT.      Week 8: continue PT, transition to shoe with heel lift, wean off crutches  PT 2-3 times/week and home exercises daily  Progress with PT over ~4 months  Week 12: wean off of shoe lift  Week 20 / 5 months: gentle jog  Week 24 / 6 months:  Gradual return to sports as tolerated     DETAILED PHYSICAL THERAPY PROTOCOL:  Initial Phase  Begin gentle ROM, edema control, and progress to gentle strengthening as tolerated.   No dorsiflexion past neutral.     Phase I: weeks 6 - 10  Goals:  Normalize gait  Progress range of motion  Normalize dorsiflexion, inversion, and eversion ankle strength 5/5  Treatment Recommendations:  Gait training, wean off crutches/cane when gait non-antalgic  Heel lift in shoe to assist non-apprehensive and normalized gait  AROM dorsiflexion/plantarflexion/inversion/eversion  Proprioception training  Isometrics/isotonics: inversion/eversion  Sitting heel rise  PREs plantarflexion/dorsiflexion with knee flexed to 90; after 2 weeks, progress to PREs with knee extended to 0  Leg press  Bike  Alphabet drawing  Retro treadmill  Forward step up program  Underwater treadmill system for gait training  Precautions:  Avoid passive heel cord stretching  Minimum Criteria for Advancement:  Normal gait pattern  Manual muscle grade test of 5/5 for dorsiflexion, inversion, and eversion     Phase II: weeks 10 - 20  Goals:  Restore full functional range of motion  Normalize plantarflexion strength 5/5  Normalize balance  Return to functional activities without pain  Ability to descend stairs  Treatment Recommendations:  Submaximal sport-specific skill development  Proprioception training: BAPS, prop board, foam rollers, trampoline, [employer]  Isometrics/isotonics: inversion/eversion  Isokinetic plantarflexion/dorsiflexion  Standing heel rise  Aggressive PREs plantarflexion  Progress proximal strengthening (PREs)  Jamilah Meraz Versaclimber  Forward step down program  Running in underwater treadmill system  Precautions:  Avoid pain with therapeutic exercise and functional activities  Avoid high loading the Achilles tendon (i.e. aggressive stretching in dorsiflexion with body weight or jumping)  Minimum Criteria for Advancement:  No apprehension with activities of daily living  Normal flexibility  Adequate strength base shown by ability to perform ten unilateral heel raises  Ability to descend stairs reciprocally  Symmetrical lower extremity balance     Phase III: weeks 20 - 28  Goals:  Demonstrate ability to run forward on a treadmill symptom-free  Average peak torque of 75% with isokinetic testing  Maximize strength and flexibility as to meet all demands of ADLs  Return to functional activity without limitation  Higher level of dynamic activity with lack of apprehension with sport-specific movements  Treatment Recommendations:  Start forward treadmill running  Isokinetic testing and training  Continue lower extremity strengthening and flexibility program  Advance proprioception training with perturbation  Light plyometric training (bilateral jumping activities)  Continue aggressive plantarflexion PREs (emphasize eccentric activity)  Submaximal sport specific skill development drills  Progress Jamilah chapman Versaclimber  Continue to progress proximal strengthening of lower extremities (PREs)  Precautions:  No apprehension or pain with dynamic activity  Avoid running or sport activity until adequate strength and flexibility is achieved  Minimum Criteria for Advancement:  Pain free running  Average peak torque of isokinetic test = 75% of non-involved  Normal strength (5/5 throughout ankle)  Sports-specific drills with zero apprehension     Phase IV: Return to Sport (weeks 28 - one year)  Goals:  Lack of apprehension with sports activity  Maximize strength and flexibility as to meet demands of individual's sport activity  Treatment Recommendations:  Advanced functional exercises and agility exercises  Plyometrics  Sport-specific exercises  Isokinetic testing  Functional test assessment (such as vertical jump test)  Precautions:  Avoid pain with therapeutic, functional, and sport activity  Avoid full sport activity until adequate strength and flexibility  Criteria for Discharge:  Flexibility and strength to accepted levels for sports performance  Lack of apprehension with sport-specific movements  85% limb symmetry with vertical jump test  85% limb symmetry for average peak isokinetic torque (PF/DF/inv/ev)  Independent performance of gym/home exercise program

## 2023-10-06 NOTE — PROGRESS NOTES
Curtis Robert M.D. Attending, Orthopaedic Surgery  Foot and Ankle  [address] [facility]      ORTHOPAEDIC FOOT AND ANKLE POST-OP VISIT     Procedure:     Left achilles rupture repair       Date of surgery:   8/22/23      PLAN  1. Weightbearing Status- WBAT operative extremity  2. DVT prophylaxis- completed  3. Continue to elevate 23hrs/day getting up 1x per hour to prevent a blood clot  4. Pain control- OTC pain medication  5. RTC in 6 weeks  6. Xrays needed next visit - no    History of Present Illness:   Chief Complaint:   Chief Complaint   Patient presents with   • Left Foot - Post-op     Lazaro Odell is a 34 y.o. male who is being seen for post-operative visit for the above procedure. Pain is well controlled and the patient has successfully transitioned to OTC pain medicines. he is taking Xarelto for DVT prophylaxis. Patient has been WBAT in a CAM boot. Review of Systems:  General- denies fever/chills  Respiratory- denies cough or SOB  Cardio- denies chest pain or palpitations  GI- denies abdominal pain  Musculoskeletal- Negative except noted above  Skin- denies rashes or wounds    Physical Exam:   /80   Ht 5' 10" (1.778 m)   Wt 109 kg (240 lb)   BMI 34.44 kg/m²   General/Constitutional: No apparent distress: well-nourished and well developed. Eyes: normal ocular motion  Lymphatic: No appreciable lymphadenopathy  Respiratory: Non-labored breathing  Vascular: No edema, swelling or tenderness, except as noted in detailed exam.  Integumentary: No impressive skin lesions present, except as noted in detailed exam.  Neuro: No ataxia or tremors noted  Psych: Normal mood and affect, oriented to person, place and time. Appropriate affect. Musculoskeletal: Normal, except as noted in detailed exam and in HPI. Examination    left        Incision Clean, dry, intact  Sutures Previously removed.     Ecchymosis none    Swelling mild    Sensation Intact to light touch throughout sural, saphenous, superficial peroneal, deep peroneal and medial/lateral plantar nerve distributions. [place]-Chris 5.07 filament (10g) testing deferred. Cardiovascular Brisk capillary refill < 2 seconds,intact DP and PT pulses    Special Tests None      Imaging Studies:   None    Scribe Attestation      I,:  Suraj Garcia PA-C am acting as a scribe while in the presence of the attending physician.:       I,:  Jesica Amaro MD personally performed the services described in this documentation    as scribed in my presence.:                   Vicenta Munch. Lachman, MD  Foot & Ankle Surgery   Department of [address] [place] [address]      I personally performed the service. Vicenta Munch. Lachman, MD

## 2023-10-09 ENCOUNTER — OFFICE VISIT (OUTPATIENT)
Dept: PHYSICAL THERAPY | Facility: MEDICAL CENTER | Age: 33
End: 2023-10-09
Attending: ORTHOPAEDIC SURGERY
Payer: COMMERCIAL

## 2023-10-09 DIAGNOSIS — S86.012A TORN ACHILLES TENDON, LEFT, INITIAL ENCOUNTER: Primary | ICD-10-CM

## 2023-10-09 PROCEDURE — 97140 MANUAL THERAPY 1/> REGIONS: CPT | Performed by: PHYSICAL THERAPIST

## 2023-10-09 PROCEDURE — 97110 THERAPEUTIC EXERCISES: CPT | Performed by: PHYSICAL THERAPIST

## 2023-10-09 NOTE — PROGRESS NOTES
Daily Note     Today's date: 10/9/2023  Patient name: Ema Mcwilliams  : 1990  MRN: [id number]  Referring provider: Paxton Hoffman MD  Dx:   Encounter Diagnosis     ICD-10-CM    1. Torn Achilles tendon, left, initial encounter  S86.012A                      Subjective: patient reports no issues after his last visit. He states he followed up with the surgeon which went well. He is now FWB in his boot with no heel lifts. He is able to transition into his sneaker on 10/20. Objective: See treatment diary below      Assessment: Tolerated treatment well. Progressed program as noted below. He had no reports of increased symptoms throughout the session. Patient exhibited good technique with therapeutic exercises and would benefit from continued PT      Plan: Continue per plan of care. Progress treament per protocol. Precautions: DOS 23, protocol attached to chart.   transitioning to a sneaker (10/20)    Manuals 9/27 10/2 10/9          Ankle PROM (IV,EV,PF)   SK                                                 Neuro Re-Ed                                                                                                        Ther Ex             Ankle Pump (not past 0*DF) x20 x30 x30          Ankle IV/EV x20 x30 x30          SLR  2x10 3x10 3x10          Ankle 4 way   90*  x20 ea 90* 0*        S/l hip abd   3x10          Seated HR   2x10                                    Ther Activity                                       Gait Training                                       Modalities

## 2023-10-11 ENCOUNTER — OFFICE VISIT (OUTPATIENT)
Dept: PHYSICAL THERAPY | Facility: MEDICAL CENTER | Age: 33
End: 2023-10-11
Attending: ORTHOPAEDIC SURGERY
Payer: COMMERCIAL

## 2023-10-11 DIAGNOSIS — S86.012A TORN ACHILLES TENDON, LEFT, INITIAL ENCOUNTER: Primary | ICD-10-CM

## 2023-10-11 PROCEDURE — 97110 THERAPEUTIC EXERCISES: CPT | Performed by: PHYSICAL THERAPIST

## 2023-10-11 PROCEDURE — 97140 MANUAL THERAPY 1/> REGIONS: CPT | Performed by: PHYSICAL THERAPIST

## 2023-10-11 NOTE — PROGRESS NOTES
Daily Note     Today's date: 10/11/2023  Patient name: Jael Dillon  : 1990  MRN: [id number]  Referring provider: Shayan Patrick MD  Dx:   Encounter Diagnosis     ICD-10-CM    1. Torn Achilles tendon, left, initial encounter  S86.012A                      Subjective: patient offers no new complaints. Objective: See treatment diary below      Assessment: Tolerated treatment well. Continued with program as noted below. No reports of increased symptoms throughout the session. He demonstrates abnormal gait with left LE in ER, discussed trying to ambulate with leg straight. Patient demonstrated fatigue post treatment, exhibited good technique with therapeutic exercises, and would benefit from continued PT      Plan: Continue per plan of care. Progress treatment as tolerated. Progress treament per protocol. Precautions: DOS 23, protocol attached to chart.   transitioning to a sneaker (10/20)    Manuals 9/27 10/2 10/9 10/11         Ankle PROM (IV,EV,PF)   SK SK         Manual Nishi    X10 ea                                   Neuro Re-Ed                                                                                                        Ther Ex             Ankle Pump (not past 0*DF) x20 x30 x30 x30         Ankle IV/EV x20 x30 x30 x30         SLR  2x10 3x10 3x10 3x10         Ankle 4 way   90*  x20 ea 90*  x30 ea  Yellow 0*        S/l hip abd   3x10 3x10         Seated HR   2x10 3x10                                   Ther Activity                                       Gait Training                                       Modalities

## 2023-10-16 ENCOUNTER — OFFICE VISIT (OUTPATIENT)
Dept: PHYSICAL THERAPY | Facility: MEDICAL CENTER | Age: 33
End: 2023-10-16
Attending: ORTHOPAEDIC SURGERY
Payer: COMMERCIAL

## 2023-10-16 DIAGNOSIS — S86.012A TORN ACHILLES TENDON, LEFT, INITIAL ENCOUNTER: Primary | ICD-10-CM

## 2023-10-16 PROCEDURE — 97140 MANUAL THERAPY 1/> REGIONS: CPT | Performed by: PHYSICAL THERAPIST

## 2023-10-16 PROCEDURE — 97110 THERAPEUTIC EXERCISES: CPT | Performed by: PHYSICAL THERAPIST

## 2023-10-16 NOTE — PROGRESS NOTES
Daily Note     Today's date: 10/16/2023  Patient name: Lona Blunt  : 1990  MRN: [id number]  Referring provider: Quynh Johnson MD  Dx:   Encounter Diagnosis     ICD-10-CM    1. Torn Achilles tendon, left, initial encounter  S86.012A                      Subjective: Patient reports he was on his feet a lot this weekend and was pretty sore yesterday morning and still sore today. Objective: See treatment diary below      Assessment: Tolerated treatment well. Patient demonstrated fatigue post treatment, exhibited good technique with therapeutic exercises, and would benefit from continued PT      Plan: Continue per plan of care. Progress treatment as tolerated. Precautions: DOS 23, protocol attached to chart.   transitioning to a sneaker (10/20)    Manuals 9/27 10/2 10/9 10/11 10/16        Ankle PROM (IV,EV,PF)   SK SK SK        Manual Nishi    X10 ea x10 ea                                  Neuro Re-Ed                                                                                                        Ther Ex             Ankle Pump (not past 0*DF) x20 x30 x30 x30 x30        Ankle IV/EV x20 x30 x30 x30 x30        SLR  2x10 3x10 3x10 3x10 3x10        Ankle 4 way   90*  x20 ea 90*  x30 ea  Yellow 0*  X30 ea yellow        S/l hip abd   3x10 3x10 3x10        Seated HR   2x10 3x10 3x10                                  Ther Activity                                       Gait Training                                       Modalities

## 2023-10-18 ENCOUNTER — APPOINTMENT (OUTPATIENT)
Dept: PHYSICAL THERAPY | Facility: MEDICAL CENTER | Age: 33
End: 2023-10-18
Attending: ORTHOPAEDIC SURGERY
Payer: COMMERCIAL

## 2023-10-19 ENCOUNTER — OFFICE VISIT (OUTPATIENT)
Dept: PHYSICAL THERAPY | Facility: MEDICAL CENTER | Age: 33
End: 2023-10-19
Attending: ORTHOPAEDIC SURGERY
Payer: COMMERCIAL

## 2023-10-19 DIAGNOSIS — S86.012A TORN ACHILLES TENDON, LEFT, INITIAL ENCOUNTER: Primary | ICD-10-CM

## 2023-10-19 PROCEDURE — 97140 MANUAL THERAPY 1/> REGIONS: CPT | Performed by: PHYSICAL THERAPIST

## 2023-10-19 PROCEDURE — 97110 THERAPEUTIC EXERCISES: CPT | Performed by: PHYSICAL THERAPIST

## 2023-10-19 NOTE — PROGRESS NOTES
Daily Note     Today's date: 10/19/2023  Patient name: Jael Dillon  : 1990  MRN: [id number]  Referring provider: Shayan Patrick MD  Dx:   Encounter Diagnosis     ICD-10-CM    1. Torn Achilles tendon, left, initial encounter  S86.012A                      Subjective: patient mentions having some calf pain that felt like a cramp but didn't actually cramp, he mentions he had a DVT in the past so he was a little nervous. Objective: See treatment diary below  No fever and no warmth, swelling, redness, or tenderness noted in L calf. Assessment: Tolerated treatment well. Discussed DVT symptoms, and advised to get checked if symptoms get worse or any other symptoms arise, he demonstrates understanding. Discussed transitioning into sneaker tomorrow starting with 2 hours. Patient demonstrated fatigue post treatment, exhibited good technique with therapeutic exercises, and would benefit from continued PT      Plan: Continue per plan of care. Progress treatment as tolerated. Precautions: DOS 23, protocol attached to chart.   transitioning to a sneaker (10/20)    Manuals 9/27 10/2 10/9 10/11 10/16 10/19       Ankle PROM (IV,EV,PF)   SK SK SK SK       Manual Nishi    X10 ea x10 ea x10 ea                                 Neuro Re-Ed                                                                                                        Ther Ex             Ankle Pump (not past 0*DF) x20 x30 x30 x30 x30 Alpha 2x       Ankle IV/EV x20 x30 x30 x30 x30 D/c       SLR  2x10 3x10 3x10 3x10 3x10 2# 3x10       Ankle 4 way   90*  x20 ea 90*  x30 ea  Yellow 0*  X30 ea yellow x30 ea  red       S/l hip abd   3x10 3x10 3x10 2# 3x10       Seated HR   2x10 3x10 3x10 3x10                                 Ther Activity                                       Gait Training                                       Modalities

## 2023-10-23 ENCOUNTER — OFFICE VISIT (OUTPATIENT)
Dept: PHYSICAL THERAPY | Facility: MEDICAL CENTER | Age: 33
End: 2023-10-23
Attending: ORTHOPAEDIC SURGERY
Payer: COMMERCIAL

## 2023-10-23 DIAGNOSIS — S86.012A TORN ACHILLES TENDON, LEFT, INITIAL ENCOUNTER: Primary | ICD-10-CM

## 2023-10-23 PROCEDURE — 97140 MANUAL THERAPY 1/> REGIONS: CPT | Performed by: PHYSICAL THERAPIST

## 2023-10-23 PROCEDURE — 97110 THERAPEUTIC EXERCISES: CPT | Performed by: PHYSICAL THERAPIST

## 2023-10-23 NOTE — PROGRESS NOTES
Daily Note     Today's date: 10/23/2023  Patient name: Mayi Morin  : 1990  MRN: [id number]  Referring provider: Blanquita Garner MD  Dx:   Encounter Diagnosis     ICD-10-CM    1. Torn Achilles tendon, left, initial encounter  S86.012A                      Subjective: patient reports he transitioned into walking with a sneaker, yesterday was tough, but overall it is going well. Objective: See treatment diary below      Assessment: Tolerated treatment well. Able to progress program as noted below with no repots of increased symptoms. Patient demonstrated fatigue post treatment, exhibited good technique with therapeutic exercises, and would benefit from continued PT      Plan: Continue per plan of care. Progress treatment as tolerated.        Precautions: DOS 23 R Achilles Tendon Repair    Manuals 9/27 10/2 10/9 10/11 10/16 10/19 10/23      Ankle PROM (IV,EV,PF)   SK SK SK SK SK      Ankle MRE    X10 ea x10 ea x10 ea x10 ea                                Neuro Re-Ed                                                                                                        Ther Ex             Bike       5' L5      Ankle Pump (not past 0*DF) x20 x30 x30 x30 x30 Alpha 2x Alpha 2x      Ankle IV/EV x20 x30 x30 x30 x30 D/c       SLR  2x10 3x10 3x10 3x10 3x10 2# 3x10       Ankle 4 way   90*  x20 ea 90*  x30 ea  Yellow 0*  X30 ea yellow x30 ea  red x30 ea blue      S/l hip abd   3x10 3x10 3x10 2# 3x10       Seated HR   2x10 3x10 3x10 3x10 3x10      Mini Squat       2x10      Leg Press DL       50# 3x10                   Ther Activity             Step up fwd        6" 2x10                   Gait Training                                       Modalities

## 2023-10-25 ENCOUNTER — OFFICE VISIT (OUTPATIENT)
Dept: PHYSICAL THERAPY | Facility: MEDICAL CENTER | Age: 33
End: 2023-10-25
Attending: ORTHOPAEDIC SURGERY
Payer: COMMERCIAL

## 2023-10-25 DIAGNOSIS — S86.012A TORN ACHILLES TENDON, LEFT, INITIAL ENCOUNTER: Primary | ICD-10-CM

## 2023-10-25 PROCEDURE — 97110 THERAPEUTIC EXERCISES: CPT | Performed by: PHYSICAL THERAPIST

## 2023-10-25 PROCEDURE — 97140 MANUAL THERAPY 1/> REGIONS: CPT | Performed by: PHYSICAL THERAPIST

## 2023-10-25 NOTE — PROGRESS NOTES
Daily Note     Today's date: 10/25/2023  Patient name: Ema Mcwilliams  : 1990  MRN: [id number]  Referring provider: Paxton Hoffman MD  Dx:   Encounter Diagnosis     ICD-10-CM    1. Torn Achilles tendon, left, initial encounter  S86.012A                      Subjective: patient reports his foot slipped off the bike peddle when on a stationary bike and had some increased pain, however it has been getting better since the incident no bruising and able to weightbear without any pain. Objective: See treatment diary below      Assessment: Tolerated treatment well. Patient demonstrated fatigue post treatment, exhibited good technique with therapeutic exercises, and would benefit from continued PT      Plan: Continue per plan of care. Progress treatment as tolerated.        Precautions: DOS 23 R Achilles Tendon Repair    Manuals 9/27 10/2 10/9 10/11 10/16 10/19 10/23 10/25     Ankle PROM    SK SK SK SK SK SK     Ankle MRE    X10 ea x10 ea x10 ea x10 ea x10 ea                               Neuro Re-Ed                                                                                                        Ther Ex             Bike       5' L5 7' L5     Ankle Pump (not past 0*DF) x20 x30 x30 x30 x30 Alpha 2x Alpha 2x Alpha 2x     Ankle IV/EV x20 x30 x30 x30 x30 D/c       SLR  2x10 3x10 3x10 3x10 3x10 2# 3x10       Ankle 4 way   90*  x20 ea 90*  x30 ea  Yellow 0*  X30 ea yellow x30 ea  red x30 ea blue x30 ea blue     S/l hip abd   3x10 3x10 3x10 2# 3x10       Seated HR   2x10 3x10 3x10 3x10 3x10 5# 3x10     Mini Squat       2x10 3x10     Leg Press DL       50# 3x10 60# 3x10                  Ther Activity             Step up fwd        6" 2x10 6" 3x10                  Gait Training                                       Modalities

## 2023-10-30 ENCOUNTER — OFFICE VISIT (OUTPATIENT)
Dept: PHYSICAL THERAPY | Facility: MEDICAL CENTER | Age: 33
End: 2023-10-30
Attending: ORTHOPAEDIC SURGERY
Payer: COMMERCIAL

## 2023-10-30 DIAGNOSIS — S86.012A TORN ACHILLES TENDON, LEFT, INITIAL ENCOUNTER: Primary | ICD-10-CM

## 2023-10-30 PROCEDURE — 97140 MANUAL THERAPY 1/> REGIONS: CPT | Performed by: PHYSICAL THERAPIST

## 2023-10-30 PROCEDURE — 97110 THERAPEUTIC EXERCISES: CPT | Performed by: PHYSICAL THERAPIST

## 2023-10-30 NOTE — PROGRESS NOTES
Daily Note     Today's date: 10/30/2023  Patient name: John Stephens  : 1990  MRN: [id number]  Referring provider: Jesica Amaro MD  Dx:   Encounter Diagnosis     ICD-10-CM    1. Torn Achilles tendon, left, initial encounter  S86.012A                      Subjective: patient reports no issues after last visit. Objective: See treatment diary below      Assessment: Tolerated treatment well. Patient demonstrated fatigue post treatment, exhibited good technique with therapeutic exercises, and would benefit from continued PT      Plan: Continue per plan of care. Progress treatment as tolerated.        Precautions: DOS 23 R Achilles Tendon Repair    Manuals 9/27 10/2 10/9 10/11 10/16 10/19 10/23 10/25 10/30    Ankle PROM    SK SK SK SK SK SK SK    Ankle MRE    X10 ea x10 ea x10 ea x10 ea x10 ea x10 ea                              Neuro Re-Ed                                                                                                        Ther Ex             Bike       5' L5 7' L5 10' L5    Ankle Pump (not past 0*DF) x20 x30 x30 x30 x30 Alpha 2x Alpha 2x Alpha 2x Alpha 2x    Ankle IV/EV x20 x30 x30 x30 x30 D/c       SLR  2x10 3x10 3x10 3x10 3x10 2# 3x10       Ankle 4 way   90*  x20 ea 90*  x30 ea  Yellow 0*  X30 ea yellow x30 ea  red x30 ea blue x30 ea blue x30 ea black    S/l hip abd   3x10 3x10 3x10 2# 3x10       Seated HR   2x10 3x10 3x10 3x10 3x10 5# 3x10 5# 3x10    Mini Squat       2x10 3x10 3x10    Leg Press DL       50# 3x10 60# 3x10 70#  3x10                 Ther Activity             Step up fwd        6" 2x10 6" 3x10 6" 3x10                 Gait Training                                       Modalities

## 2023-11-01 ENCOUNTER — APPOINTMENT (OUTPATIENT)
Dept: PHYSICAL THERAPY | Facility: MEDICAL CENTER | Age: 33
End: 2023-11-01
Attending: ORTHOPAEDIC SURGERY
Payer: COMMERCIAL

## 2023-11-02 ENCOUNTER — OFFICE VISIT (OUTPATIENT)
Dept: PHYSICAL THERAPY | Facility: MEDICAL CENTER | Age: 33
End: 2023-11-02
Attending: ORTHOPAEDIC SURGERY
Payer: COMMERCIAL

## 2023-11-02 DIAGNOSIS — S86.012A TORN ACHILLES TENDON, LEFT, INITIAL ENCOUNTER: Primary | ICD-10-CM

## 2023-11-02 PROCEDURE — 97110 THERAPEUTIC EXERCISES: CPT | Performed by: PHYSICAL THERAPIST

## 2023-11-02 PROCEDURE — 97140 MANUAL THERAPY 1/> REGIONS: CPT | Performed by: PHYSICAL THERAPIST

## 2023-11-02 NOTE — PROGRESS NOTES
Daily Note     Today's date: 2023  Patient name: Josefina Cabrera  : 1990  MRN: [id number]  Referring provider: Matti Barrientos MD  Dx:   Encounter Diagnosis     ICD-10-CM    1. Torn Achilles tendon, left, initial encounter  S86.012A                      Subjective: patient offers no new complaints, states things are going well thus far. Objective: See treatment diary below      Assessment: Tolerated treatment well. Patient demonstrated fatigue post treatment, exhibited good technique with therapeutic exercises, and would benefit from continued PT next visit can start next phase with descending steps, standing heel raise, and incorporate balance activities, etc      Plan: Continue per plan of care. Progress treatment as tolerated.        Precautions: DOS 23 R Achilles Tendon Repair    Manuals 10/16 10/19 10/23 10/25 10/30 11/2       Ankle PROM  SK SK SK SK SK SK       Ankle MRE x10 ea x10 ea x10 ea x10 ea x10 ea x10 ea                                 Neuro Re-Ed              SL Balance       NV       BAPS Board       NV                                                                        Ther Ex             Bike   5' L5 7' L5 10' L5 8' L5       Ankle Pump (not past 0*DF) x30 Alpha 2x Alpha 2x Alpha 2x Alpha 2x Alpha 2x       Ankle IV/EV x30 D/c           SLR  3x10 2# 3x10           Ankle 4 way 0*  X30 ea yellow x30 ea  red x30 ea blue x30 ea blue x30 ea black x30 ea black       S/l hip abd 3x10 2# 3x10           Seated HR 3x10 3x10 3x10 5# 3x10 5# 3x10 5# 3x10       Mini Squat   2x10 3x10 3x10 3x10       Leg Press DL   50# 3x10 60# 3x10 70#  3x10 80# 3x10       Standing HR       NV                                 Ther Activity             Step up fwd    6" 2x10 6" 3x10 6" 3x10 8" 3x10       Step Down Fwd      NV       Gait Training                                       Modalities

## 2023-11-06 ENCOUNTER — APPOINTMENT (OUTPATIENT)
Dept: PHYSICAL THERAPY | Facility: MEDICAL CENTER | Age: 33
End: 2023-11-06
Attending: ORTHOPAEDIC SURGERY
Payer: COMMERCIAL

## 2023-11-08 ENCOUNTER — APPOINTMENT (OUTPATIENT)
Dept: PHYSICAL THERAPY | Facility: MEDICAL CENTER | Age: 33
End: 2023-11-08
Attending: ORTHOPAEDIC SURGERY
Payer: COMMERCIAL

## 2023-11-09 ENCOUNTER — OFFICE VISIT (OUTPATIENT)
Dept: PHYSICAL THERAPY | Facility: MEDICAL CENTER | Age: 33
End: 2023-11-09
Attending: ORTHOPAEDIC SURGERY
Payer: COMMERCIAL

## 2023-11-09 DIAGNOSIS — S86.012A TORN ACHILLES TENDON, LEFT, INITIAL ENCOUNTER: Primary | ICD-10-CM

## 2023-11-09 PROCEDURE — 97140 MANUAL THERAPY 1/> REGIONS: CPT | Performed by: PHYSICAL THERAPIST

## 2023-11-09 PROCEDURE — 97110 THERAPEUTIC EXERCISES: CPT | Performed by: PHYSICAL THERAPIST

## 2023-11-09 NOTE — PROGRESS NOTES
Daily Note     Today's date: 2023  Patient name: Mayi Morin  : 1990  MRN: [id number]  Referring provider: Blanquita Garner MD  Dx:   Encounter Diagnosis     ICD-10-CM    1. Torn Achilles tendon, left, initial encounter  S86.012A                      Subjective: patient reports some calf soreness but overall doing well. Objective: See treatment diary below      Assessment: Tolerated treatment well. Continued to progress program as noted below. Updated HEP to include standing heel raises. Patient demonstrated fatigue post treatment, exhibited good technique with therapeutic exercises, and would benefit from continued PT      Plan: Continue per plan of care. Progress treatment as tolerated.        Precautions: DOS 23 R Achilles Tendon Repair    Manuals 10/16 10/19 10/23 10/25 10/30 11/2 11/9      Ankle PROM  SK SK SK SK SK SK SK      Ankle MRE x10 ea x10 ea x10 ea x10 ea x10 ea x10 ea X10 ea                                Neuro Re-Ed              SL Balance       NV  20"x2      BAPS Board  (AP/SS/CW/CCW)       NV L3   x15 ea                                                                       Ther Ex             Bike   5' L5 7' L5 10' L5 8' L5 8' L5      Ankle Pump (not past 0*DF) x30 Alpha 2x Alpha 2x Alpha 2x Alpha 2x Alpha 2x       Ankle IV/EV x30 D/c           SLR  3x10 2# 3x10           Ankle 4 way 0*  X30 ea yellow x30 ea  red x30 ea blue x30 ea blue x30 ea black x30 ea black X30 ea black      S/l hip abd 3x10 2# 3x10           Seated HR 3x10 3x10 3x10 5# 3x10 5# 3x10 5# 3x10 6# 3x10      Mini Squat   2x10 3x10 3x10 3x10       Leg Press DL   50# 3x10 60# 3x10 70#  3x10 80# 3x10 SL 50# 2x10      Standing HR       NV 2x10                                Ther Activity             Step up fwd    6" 2x10 6" 3x10 6" 3x10 8" 3x10 8"   3x10      Step Down Fwd      NV  4" 2x10      Gait Training                                       Modalities

## 2023-11-13 ENCOUNTER — OFFICE VISIT (OUTPATIENT)
Dept: PHYSICAL THERAPY | Facility: MEDICAL CENTER | Age: 33
End: 2023-11-13
Attending: ORTHOPAEDIC SURGERY
Payer: COMMERCIAL

## 2023-11-13 DIAGNOSIS — S86.012A TORN ACHILLES TENDON, LEFT, INITIAL ENCOUNTER: Primary | ICD-10-CM

## 2023-11-13 PROCEDURE — 97140 MANUAL THERAPY 1/> REGIONS: CPT | Performed by: PHYSICAL THERAPIST

## 2023-11-13 PROCEDURE — 97110 THERAPEUTIC EXERCISES: CPT | Performed by: PHYSICAL THERAPIST

## 2023-11-13 PROCEDURE — 97530 THERAPEUTIC ACTIVITIES: CPT | Performed by: PHYSICAL THERAPIST

## 2023-11-13 NOTE — PROGRESS NOTES
Daily Note     Today's date: 2023  Patient name: Wilson Wang  : 1990  MRN: [id number]  Referring provider: Robe Lemon MD  Dx:   Encounter Diagnosis     ICD-10-CM    1. Torn Achilles tendon, left, initial encounter  S86.012A                      Subjective: patient reports he went [employer] hunting this weekend. He states the grounds were pretty flat but he was on his feet for 4 hours which made it really sore over the weekend and today. Objective: See treatment diary below      Assessment: Tolerated treatment well. He notes a small pinching pain with heel raises today. No other pain noted throughout the session. Patient demonstrated fatigue post treatment, exhibited good technique with therapeutic exercises, and would benefit from continued PT      Plan: Continue per plan of care. Progress treatment as tolerated.        Precautions: DOS 23 R Achilles Tendon Repair    Manuals 10/16 10/19 10/23 10/25 10/30 11/2 11/9 11/13     Ankle PROM  SK SK SK SK SK SK SK SK     Ankle MRE x10 ea x10 ea x10 ea x10 ea x10 ea x10 ea X10 ea x10                               Neuro Re-Ed              SL Balance       NV  20"x2 Alpha x2     BAPS Board  (AP/SS/CW/CCW)       NV L3   x15 ea                                                                       Ther Ex             Bike   5' L5 7' L5 10' L5 8' L5 8' L5 8' L5     Ankle Pump (not past 0*DF) x30 Alpha 2x Alpha 2x Alpha 2x Alpha 2x Alpha 2x  Alpha 2x     Ankle IV/EV x30 D/c           SLR  3x10 2# 3x10           Ankle 4 way 0*  X30 ea yellow x30 ea  red x30 ea blue x30 ea blue x30 ea black x30 ea black X30 ea black x30 ea black     S/l hip abd 3x10 2# 3x10           Seated HR 3x10 3x10 3x10 5# 3x10 5# 3x10 5# 3x10 6# 3x10 10# 3x10     Mini Squat   2x10 3x10 3x10 3x10       Leg Press DL   50# 3x10 60# 3x10 70#  3x10 80# 3x10 SL 50# 2x10 SL 50# 3x10     Standing HR       NV 2x10 2x10                               Ther Activity             Step up fwd 6" 2x10 6" 3x10 6" 3x10 8" 3x10 8"   3x10 8" 3x10     Step Down Fwd      NV  4" 2x10 4" 3x10     Gait Training                                       Modalities

## 2023-11-15 ENCOUNTER — EVALUATION (OUTPATIENT)
Dept: PHYSICAL THERAPY | Facility: MEDICAL CENTER | Age: 33
End: 2023-11-15
Attending: ORTHOPAEDIC SURGERY
Payer: COMMERCIAL

## 2023-11-15 DIAGNOSIS — S86.012A TORN ACHILLES TENDON, LEFT, INITIAL ENCOUNTER: Primary | ICD-10-CM

## 2023-11-15 PROCEDURE — 97110 THERAPEUTIC EXERCISES: CPT | Performed by: PHYSICAL THERAPIST

## 2023-11-15 PROCEDURE — 97140 MANUAL THERAPY 1/> REGIONS: CPT | Performed by: PHYSICAL THERAPIST

## 2023-11-15 NOTE — PROGRESS NOTES
PT Re-Evaluation     Today's date: 11/15/2023  Patient name: Mayi Morin  : 1990  MRN: [id number]  Referring provider: Blanquita Garner MD  Dx:   Encounter Diagnosis     ICD-10-CM    1. Torn Achilles tendon, left, initial encounter  S86.012A                      Assessment  Assessment details: Mayi Morin is a 34 y.o. male who presents to physical therapy 12 weeks s/p left achilles tendon repair . Since initial evaluation he has demonstrated improvements in range of motion, strength, gait, and functional abilities. However, he continues to be limited as noted below. The current limitations are affecting [employer] ability to function at prior level. He will benefit from skilled physical therapy to address the current impairments and functional limitations to enable him to return to daily activities at maximal level. Thank you for the referral.    Impairments: abnormal gait, abnormal or restricted ROM, activity intolerance, impaired balance, impaired physical strength, lacks appropriate home exercise program, weight-bearing intolerance and poor posture     Symptom irritability: lowUnderstanding of Dx/Px/POC: good   Prognosis: good    Goals  STG  Patient will decrease pain at worst to 5/10 - MET  Patient will d/c cam boot - MET  Patient will demonstrate L ankle AROM WFL - IN PROGRESS  Patient will d/c crutches - MET  Patient will be independent with basic HEP - MET    LTG  Patient will decrease pain at worst to 1/10  Patient will demonstrate left ankle strength 5/5 in all planes of motion  Patient will report ability to ascend/descend steps in a reciprocal pattern.   Patient will be independent with comprehensive HEP    Plan  Patient would benefit from: skilled physical therapy  Planned modality interventions: cryotherapy and thermotherapy: hydrocollator packs  Planned therapy interventions: manual therapy, neuromuscular re-education, patient education, therapeutic activities, therapeutic exercise, therapeutic training and home exercise program  Frequency: 2x week  Duration in weeks: 8  Treatment plan discussed with: patient      Subjective Evaluation    History of Present Illness  Mechanism of injury: Toshia Koenig is a 34 y.o. male presenting to physical therapy on 23 5 weeks s/p left achilles tendon repair. He mentions overall things have been going well. He mentions bathing and completing ADLs are challenging and requires compensations such as sitting down to get dressed. He mentions he was put in the CAM boot 2 weeks ago and has been using his crutches or scooter to get around. 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 -------------  11/15/23: patient reports things are going well thus far. He gives himself a 60-65% improvement in symptoms and functional abilities. He notes improvements in his ability to complete ADLs and household chores. He is no longer using any AD or the CAM boot for ambulation. Patient Goals  Patient goals for therapy: decreased pain, independence with ADLs/IADLs, increased strength, return to sport/leisure activities, increased motion and improved balance  Patient goal: be able to walk normally and hunt. Pain  Current pain ratin  At best pain ratin  At worst pain ratin  Location: L achillies   Quality: dull ache  Relieving factors: rest and ice    Social Support  Steps to enter house: yes  Stairs in house: no     Employment status: working  Treatments  No previous or current treatments      Objective    Observation: incision clean dry intact no signs of infection.     Palpation: no tenderness noted upon examination    L Ankle AROM:  Dorsiflexion: 3 degrees  Plantarflexion: 45 degrees  Inversion: 25 degrees  Eversion: 15 degrees    R Ankle AROM:  Dorsiflexion: 12 degrees  Plantarflexion: 45 degrees  Inversion: 30 degrees  Eversion: 15 degrees    Ankle Strength   Right:  Dorsiflexion: 5/5   Inversion: 5/5   Eversion: 5/5    Left:  Dorsiflexion: 4+/5   Inversion: 5/5   Eversion: 5/5    Heel Raises: DL: good  R: normal  L: unable      Flexibility: tightness noted bilateral hamstrings. Balance: SL R: 8 seconds,  SL L: 4 seconds    Gait: patient ambulates with no AD and is now in a sneaker. He has decreased push off and heel strike on LLE.          Precautions: DOS 8/24/23 R Achilles Tendon Repair    Manuals 10/16 10/19 10/23 10/25 10/30 11/2 11/9 11/13 11/15    Ankle PROM  SK SK SK SK SK SK SK SK     Ankle MRE x10 ea x10 ea x10 ea x10 ea x10 ea x10 ea X10 ea x10     Re-Evaluation          SK                 Neuro Re-Ed              SL Balance       NV  20"x2 Alpha x2  Alpha    2x    BAPS Board  (AP/SS/CW/CCW)       NV L3   x15 ea                                                                       Ther Ex             Bike   5' L5 7' L5 10' L5 8' L5 8' L5 8' L5 5' L5    Ankle Pump (not past 0*DF) x30 Alpha 2x Alpha 2x Alpha 2x Alpha 2x Alpha 2x  Alpha 2x     Ankle IV/EV x30 D/c           SLR  3x10 2# 3x10           Ankle 4 way 0*  X30 ea yellow x30 ea  red x30 ea blue x30 ea blue x30 ea black x30 ea black X30 ea black x30 ea black     S/l hip abd 3x10 2# 3x10           Seated HR 3x10 3x10 3x10 5# 3x10 5# 3x10 5# 3x10 6# 3x10 10# 3x10 10# 3x10    Mini Squat   2x10 3x10 3x10 3x10       Leg Press DL   50# 3x10 60# 3x10 70#  3x10 80# 3x10 SL 50# 2x10 SL 50# 3x10 SL 60# 3x10    Standing HR       NV 2x10 2x10 3x10                              Ther Activity             Step up fwd    6" 2x10 6" 3x10 6" 3x10 8" 3x10 8"   3x10 8" 3x10 8" 3x10    Step Down Fwd      NV  4" 2x10 4" 3x10 4" 3x10    Step Up & Over                                       Modalities

## 2023-11-17 ENCOUNTER — OFFICE VISIT (OUTPATIENT)
Dept: OBGYN CLINIC | Facility: CLINIC | Age: 33
End: 2023-11-17

## 2023-11-17 VITALS
WEIGHT: 242 LBS | HEIGHT: 70 IN | DIASTOLIC BLOOD PRESSURE: 80 MMHG | BODY MASS INDEX: 34.65 KG/M2 | SYSTOLIC BLOOD PRESSURE: 124 MMHG

## 2023-11-17 DIAGNOSIS — S86.012A TORN ACHILLES TENDON, LEFT, INITIAL ENCOUNTER: Primary | ICD-10-CM

## 2023-11-17 PROCEDURE — 99024 POSTOP FOLLOW-UP VISIT: CPT | Performed by: ORTHOPAEDIC SURGERY

## 2023-11-17 NOTE — PATIENT INSTRUCTIONS
Achilles Rupture - Rehab Protocol     OVERVIEW:  Week 0: Surgery  Avoid prolonged dependent position (keep foot elevated)  Week 3: CAM walker boot with 3 heel wedges, weightbearing as tolerated. remove one wedge each week (so down to 2 wedges at week 5, 1 wedge at week 6, 0 wedges at week 7). Begin PT     Week 8: continue PT, transition to shoe with heel lift, wean off crutches  PT 2-3 times/week and home exercises daily  Progress with PT over ~4 months  Week 12: wean off of shoe lift  Week 20 / 5 months: gentle jog  Week 24 / 6 months:  Gradual return to sports as tolerated     DETAILED PHYSICAL THERAPY PROTOCOL:  Initial Phase  Begin gentle ROM, edema control, and progress to gentle strengthening as tolerated.   No dorsiflexion past neutral.     Phase I: weeks 8 - 12  Goals:  Normalize gait  Progress range of motion  Normalize dorsiflexion, inversion, and eversion ankle strength 5/5  Treatment Recommendations:  Gait training, wean off crutches/cane when gait non-antalgic  Heel lift in shoe to assist non-apprehensive and normalized gait  AROM dorsiflexion/plantarflexion/inversion/eversion  Proprioception training  Isometrics/isotonics: inversion/eversion  Sitting heel rise  PREs plantarflexion/dorsiflexion with knee flexed to 90; after 2 weeks, progress to PREs with knee extended to 0  Leg press  Bike  Alphabet drawing  Retro treadmill  Forward step up program  Underwater treadmill system for gait training  Precautions:  Avoid passive heel cord stretching  Minimum Criteria for Advancement:  Normal gait pattern  Manual muscle grade test of 5/5 for dorsiflexion, inversion, and eversion     Phase II: weeks 12 - 20  Goals:  Restore full functional range of motion  Normalize plantarflexion strength 5/5  Normalize balance  Return to functional activities without pain  Ability to descend stairs  Treatment Recommendations:  Submaximal sport-specific skill development  Proprioception training: BAPS, prop board, foam rollers, tramivis, Neurocom  Isometrics/isotonics: inversion/eversion  Isokinetic plantarflexion/dorsiflexion  Standing heel rise  Aggressive PREs plantarflexion  Progress proximal strengthening (PREs)  Jamilah Chapman Versaclimber  Forward step down program  Running in underwater treadmill system  Precautions:  Avoid pain with therapeutic exercise and functional activities  Avoid high loading the Achilles tendon (i.e. aggressive stretching in dorsiflexion with body weight or jumping)  Minimum Criteria for Advancement:  No apprehension with activities of daily living  Normal flexibility  Adequate strength base shown by ability to perform ten unilateral heel raises  Ability to descend stairs reciprocally  Symmetrical lower extremity balance     Phase III: weeks 20 - 28  Goals:  Demonstrate ability to run forward on a treadmill symptom-free  Average peak torque of 75% with isokinetic testing  Maximize strength and flexibility as to meet all demands of ADLs  Return to functional activity without limitation  Higher level of dynamic activity with lack of apprehension with sport-specific movements  Treatment Recommendations:  Start forward treadmill running  Isokinetic testing and training  Continue lower extremity strengthening and flexibility program  Advance proprioception training with perturbation  Light plyometric training (bilateral jumping activities)  Continue aggressive plantarflexion PREs (emphasize eccentric activity)  Submaximal sport specific skill development drills  Progress Jamilah chapman Versaclimber  Continue to progress proximal strengthening of lower extremities (PREs)  Precautions:  No apprehension or pain with dynamic activity  Avoid running or sport activity until adequate strength and flexibility is achieved  Minimum Criteria for Advancement:  Pain free running  Average peak torque of isokinetic test = 75% of non-involved  Normal strength (5/5 throughout ankle)  Sports-specific drills with zero apprehension     Phase IV: Return to Sport (weeks 28 - one year)  Goals:  Lack of apprehension with sports activity  Maximize strength and flexibility as to meet demands of individual's sport activity  Treatment Recommendations:  Advanced functional exercises and agility exercises  Plyometrics  Sport-specific exercises  Isokinetic testing  Functional test assessment (such as vertical jump test)  Precautions:  Avoid pain with therapeutic, functional, and sport activity  Avoid full sport activity until adequate strength and flexibility  Criteria for Discharge:  Flexibility and strength to accepted levels for sports performance  Lack of apprehension with sport-specific movements  85% limb symmetry with vertical jump test  85% limb symmetry for average peak isokinetic torque (PF/DF/inv/ev)  Independent performance of gym/home exercise program

## 2023-11-17 NOTE — PROGRESS NOTES
Elenita Sims M.D. Attending, Orthopaedic Surgery  Foot and Ankle  [facility]      ORTHOPAEDIC FOOT AND ANKLE POST-OP VISIT     Procedure:      Left achilles tendon repair       Date of surgery:   8/22/23      PLAN  1. Weightbearing Status - WBAT operative extremity  2. DVT prophylaxis - Completed  3. Continue PT/HEP as directed  4. Pain control - OTC pain medication  5. RTC in 3 month(s)  6. Xrays needed next visit - No    History of Present Illness:   Chief Complaint:   Chief Complaint   Patient presents with    Left Ankle - Follow-up       Lucero Ibarra is a 34 y.o. male who is being seen for 3 month post-operative visit for the above procedure. Pain is well controlled and the patient has successfully transitioned to OTC pain medicines. he has completed Lovenox 40 mg Subcu Daily for DVT prophylaxis. Patient has been WBAT in a Supportive sneaker      Review of Systems:  General- denies fever/chills  Respiratory- denies cough or SOB  Cardio- denies chest pain or palpitations  GI- denies abdominal pain  Musculoskeletal- Negative except noted above  Skin- denies rashes or wounds    Physical Exam:   /80   Ht 5' 10" (1.778 m)   Wt 110 kg (242 lb)   BMI 34.72 kg/m²   General/Constitutional: No apparent distress: well-nourished and well developed. Eyes: normal ocular motion  Lymphatic: No appreciable lymphadenopathy  Respiratory: Non-labored breathing  Vascular: No edema, swelling or tenderness, except as noted in detailed exam.  Integumentary: No impressive skin lesions present, except as noted in detailed exam.  Neuro: No ataxia or tremors noted  Psych: Normal mood and affect, oriented to person, place and time. Appropriate affect. Musculoskeletal: Normal, except as noted in detailed exam and in HPI. Examination    left        Incision Clean, dry, intact  Sutures Previously removed.     Ecchymosis none    Swelling mild    Sensation Intact to light touch throughout sural, saphenous, superficial peroneal, deep peroneal and medial/lateral plantar nerve distributions. Isaban-Chris 5.07 filament (10g) testing deferred. Cardiovascular Brisk capillary refill < 2 seconds,intact DP and PT pulses    Special Tests None      Imaging Studies:   No new images    Scribe Attestation      I,:  Daisha Negron am acting as a scribe while in the presence of the attending physician.:       I,:  Siva Dior MD personally performed the services described in this documentation    as scribed in my presence.:                Trip Howe. Lachman, MD  Foot & Ankle Surgery   Department of [address] [place] [address]      I personally performed the service. Trip Howe.  Lachman, MD

## 2023-11-20 ENCOUNTER — APPOINTMENT (OUTPATIENT)
Dept: PHYSICAL THERAPY | Facility: MEDICAL CENTER | Age: 33
End: 2023-11-20
Attending: ORTHOPAEDIC SURGERY
Payer: COMMERCIAL

## 2023-11-27 ENCOUNTER — OFFICE VISIT (OUTPATIENT)
Dept: PHYSICAL THERAPY | Facility: MEDICAL CENTER | Age: 33
End: 2023-11-27
Attending: ORTHOPAEDIC SURGERY
Payer: COMMERCIAL

## 2023-11-27 DIAGNOSIS — S86.012A TORN ACHILLES TENDON, LEFT, INITIAL ENCOUNTER: Primary | ICD-10-CM

## 2023-11-27 PROCEDURE — 97140 MANUAL THERAPY 1/> REGIONS: CPT | Performed by: PHYSICAL THERAPIST

## 2023-11-27 PROCEDURE — 97110 THERAPEUTIC EXERCISES: CPT | Performed by: PHYSICAL THERAPIST

## 2023-11-27 NOTE — PROGRESS NOTES
Daily Note     Today's date: 2023  Patient name: Porfirio Green  : 1990  MRN: [id number]  Referring provider: Michelet Otto MD  Dx:   Encounter Diagnosis     ICD-10-CM    1. Torn Achilles tendon, left, initial encounter  S86.012A                      Subjective: patient offers no new complaints. Objective: See treatment diary below      Assessment: Tolerated treatment well. Able to complete eccentric heel raise today. Patient demonstrated fatigue post treatment, exhibited good technique with therapeutic exercises, and would benefit from continued PT      Plan: Continue per plan of care. Progress treatment as tolerated.        Precautions: DOS 23 R Achilles Tendon Repair    Manuals 10/16 10/19 10/23 10/25 10/30 11/2 11/9 11/13 11/15 11/27   Ankle PROM  SK SK SK SK SK SK SK SK  SK   Ankle MRE x10 ea x10 ea x10 ea x10 ea x10 ea x10 ea X10 ea x10     Re-Evaluation          SK                 Neuro Re-Ed              SL Balance       NV  20"x2 Alpha x2  Alpha    2x Alpha 2x   BAPS Board  (AP/SS/CW/CCW)       NV L3   x15 ea      Eccentric Heel Raise          x15                                                       Ther Ex             Bike   5' L5 7' L5 10' L5 8' L5 8' L5 8' L5 5' L5 10' L5   Ankle Pump (not past 0*DF) x30 Alpha 2x Alpha 2x Alpha 2x Alpha 2x Alpha 2x  Alpha 2x  HEP   Ankle IV/EV x30 D/c           SLR  3x10 2# 3x10           Ankle 4 way 0*  X30 ea yellow x30 ea  red x30 ea blue x30 ea blue x30 ea black x30 ea black X30 ea black x30 ea black  x30 ea black   S/l hip abd 3x10 2# 3x10           Seated HR 3x10 3x10 3x10 5# 3x10 5# 3x10 5# 3x10 6# 3x10 10# 3x10 10# 3x10 10# 3x10   Mini Squat   2x10 3x10 3x10 3x10       Leg Press DL   50# 3x10 60# 3x10 70#  3x10 80# 3x10 SL 50# 2x10 SL 50# 3x10 SL 60# 3x10 SL 70# 3x10   Standing HR       NV 2x10 2x10 3x10 3x10                             Ther Activity             Step up fwd    6" 2x10 6" 3x10 6" 3x10 8" 3x10 8"   3x10 8" 3x10 8" 3x10 8" 3x10   Step Down Fwd      NV  4" 2x10 4" 3x10 4" 3x10 6" 3x10   Step Up & Over                                       Modalities

## 2023-11-30 ENCOUNTER — OFFICE VISIT (OUTPATIENT)
Dept: PHYSICAL THERAPY | Facility: MEDICAL CENTER | Age: 33
End: 2023-11-30
Attending: ORTHOPAEDIC SURGERY
Payer: COMMERCIAL

## 2023-11-30 DIAGNOSIS — S86.012A TORN ACHILLES TENDON, LEFT, INITIAL ENCOUNTER: Primary | ICD-10-CM

## 2023-11-30 PROCEDURE — 97140 MANUAL THERAPY 1/> REGIONS: CPT | Performed by: PHYSICAL THERAPIST

## 2023-11-30 PROCEDURE — 97110 THERAPEUTIC EXERCISES: CPT | Performed by: PHYSICAL THERAPIST

## 2023-11-30 NOTE — PROGRESS NOTES
Daily Note     Today's date: 2023  Patient name: Brii Dalton  : 1990  MRN: [id number]  Referring provider: Ivone Altamirano MD  Dx:   Encounter Diagnosis     ICD-10-CM    1. Torn Achilles tendon, left, initial encounter  S86.012A                      Subjective: Patient states that he feels good. Objective: See treatment diary below. Assessment:  Patient demonstrates good tolerance to progressions without pain. Tolerated treatment well. Patient would benefit from continued PT. Plan: Continue per plan of care.       Precautions: DOS 23 R Achilles Tendon Repair    Manuals             Ankle PROM  AZ            TC mobs AZ                                      Neuro Re-Ed              SL Balance Alph 3x airex             Eccentric Heel Raise 3x10 floor                                                                Ther Ex             Bike 10'                          Ankle 4 way 30x black                         Seated HR 3x15 15#            Mini Squat             Leg Press 3x10 70# SL            Standing HR KE & KF 3x10            Standing DF 3x10                         Ther Activity             Step ups 3x10 8" 15#            Step Downs 3x10 6"                                                   Modalities

## 2023-12-04 ENCOUNTER — OFFICE VISIT (OUTPATIENT)
Dept: PHYSICAL THERAPY | Facility: MEDICAL CENTER | Age: 33
End: 2023-12-04
Attending: ORTHOPAEDIC SURGERY
Payer: COMMERCIAL

## 2023-12-04 DIAGNOSIS — S86.012A TORN ACHILLES TENDON, LEFT, INITIAL ENCOUNTER: Primary | ICD-10-CM

## 2023-12-04 PROCEDURE — 97110 THERAPEUTIC EXERCISES: CPT | Performed by: PHYSICAL THERAPIST

## 2023-12-04 PROCEDURE — 97140 MANUAL THERAPY 1/> REGIONS: CPT | Performed by: PHYSICAL THERAPIST

## 2023-12-04 NOTE — PROGRESS NOTES
Daily Note     Today's date: 2023  Patient name: Charmayne Cabot  : 1990  MRN: [id number]  Referring provider: Kristy Lebron MD  Dx:   Encounter Diagnosis     ICD-10-CM    1. Torn Achilles tendon, left, initial encounter  S86.012A                      Subjective:  Patient states that he is doing well. Objective: See treatment diary below. Assessment:  Patient demonstrates good tolerance to progressions without pain. Tolerated treatment well. Patient would benefit from continued PT. Plan: Continue per plan of care.       Precautions: DOS 23 R Achilles Tendon Repair    Manuals            Ankle PROM  AZ AZ           TC mobs AZ AZ                                     Neuro Re-Ed              SL Balance Alph 3x airex  Alph 3x airex           Eccentric Heel Raise 3x10 floor 3x12 floor                                                               Ther Ex             Bike 10'  10'                        Ankle 4 way 30x black 30x black                        Seated HR 3x15 15# 3x15 15#           Mini Squat             Leg Press 3x10 70# SL 3x12 70# SL           Standing HR KE & KF 3x10 3x10           Standing DF 3x10 3x10                        Ther Activity             Step ups 3x10 8" 15# 3x10 8" 15#           Step Downs 3x10 6" 3x10 6"           Ball toss  30x airex                                     Modalities

## 2023-12-07 ENCOUNTER — OFFICE VISIT (OUTPATIENT)
Dept: PHYSICAL THERAPY | Facility: MEDICAL CENTER | Age: 33
End: 2023-12-07
Attending: ORTHOPAEDIC SURGERY
Payer: COMMERCIAL

## 2023-12-07 DIAGNOSIS — S86.012A TORN ACHILLES TENDON, LEFT, INITIAL ENCOUNTER: Primary | ICD-10-CM

## 2023-12-07 PROCEDURE — 97110 THERAPEUTIC EXERCISES: CPT | Performed by: PHYSICAL THERAPIST

## 2023-12-07 PROCEDURE — 97140 MANUAL THERAPY 1/> REGIONS: CPT | Performed by: PHYSICAL THERAPIST

## 2023-12-07 NOTE — PROGRESS NOTES
Daily Note     Today's date: 2023  Patient name: Paola Hunt  : 1990  MRN: [id number]  Referring provider: Aracelis Potts MD  Dx:   Encounter Diagnosis     ICD-10-CM    1. Torn Achilles tendon, left, initial encounter  S86.012A                      Subjective:  Patient states that he is doing well. Objective: See treatment diary below. Assessment:  Patient demonstrates good tolerance to progressions without pain. Tolerated treatment well. Patient would benefit from continued PT. Plan: Continue per plan of care.       Precautions: DOS 23 R Achilles Tendon Repair    Manuals           Ankle PROM  AZ AZ AZ          TC mobs AZ AZ AZ                                    Neuro Re-Ed              SL Balance Alph 3x airex  Alph 3x airex           Eccentric Heel Raise 3x10 floor 3x12 floor 3x10 4'                                                              Ther Ex             Bike 10'  10' 10'                       Ankle 4 way 30x black 30x black 30x black                       Seated HR 3x15 15# 3x15 15# 3x15 15#          Mini Squat             Leg Press 3x10 70# SL 3x12 70# SL 3x15 70# SL          Standing HR KE & KF 3x10 3x10 3x15          Standing DF 3x10 3x10 3x15          Ecc heel raises   3x10 up 2 down on 1 4"          SL heel raises LP   2x10 30#          Ther Activity             Step ups 3x10 8" 15# 3x10 8" 15# 3x10 8" 15#          Step Downs 3x10 6" 3x10 6" 3x10 6"          Ball toss  30x airex 30x airex KE/KF                                    Modalities

## 2023-12-11 ENCOUNTER — OFFICE VISIT (OUTPATIENT)
Dept: PHYSICAL THERAPY | Facility: MEDICAL CENTER | Age: 33
End: 2023-12-11
Attending: ORTHOPAEDIC SURGERY
Payer: COMMERCIAL

## 2023-12-11 DIAGNOSIS — S86.012A TORN ACHILLES TENDON, LEFT, INITIAL ENCOUNTER: Primary | ICD-10-CM

## 2023-12-11 PROCEDURE — 97140 MANUAL THERAPY 1/> REGIONS: CPT | Performed by: PHYSICAL THERAPIST

## 2023-12-11 PROCEDURE — 97110 THERAPEUTIC EXERCISES: CPT | Performed by: PHYSICAL THERAPIST

## 2023-12-11 NOTE — PROGRESS NOTES
Daily Note     Today's date: 2023  Patient name: Pita Vidal  : 1990  MRN: [id number]  Referring provider: Darlene Alpers, MD  Dx:   Encounter Diagnosis     ICD-10-CM    1. Torn Achilles tendon, left, initial encounter  S86.012A                      Subjective: Patient states that he is doing well. Objective: See treatment diary below      Assessment: Tolerated treatment well. Patient would benefit from continued PT      Plan: Continue per plan of care.       Precautions: DOS 23 R Achilles Tendon Repair    Manuals          Ankle PROM  AZ AZ AZ AZ         TC mobs AZ AZ AZ AZ                                   Neuro Re-Ed              SL Balance Alph 3x airex  Alph 3x airex           Eccentric Heel Raise 3x10 floor 3x12 floor 3x10 4" 3x12 4"                                                             Ther Ex             Bike 10'  10' 10' 10'                      Ankle 4 way 30x black 30x black 30x black 30x black                      Seated HR 3x15 15# 3x15 15# 3x15 15# 3x15 15#         Mini Squat             Leg Press 3x10 70# SL 3x12 70# SL 3x15 70# SL 3x10 80# SL         Standing HR KE & KF 3x10 3x10 3x15 3x15         Standing DF 3x10 3x10 3x15 3x15         Ecc heel raises   3x10 up 2 down on 1 4" 3x12 up 2 down on 1 4"         SL heel raises LP   2x10 30# 3x10 30#         Ther Activity             Step ups 3x10 8" 15# 3x10 8" 15# 3x10 8" 15# 3x12 8" 15#         Step Downs 3x10 6" 3x10 6" 3x10 6" 3x12 6"         Ball toss  30x airex 30x airex KE/KF 30x airex KE/KF                                   Modalities

## 2023-12-18 ENCOUNTER — OFFICE VISIT (OUTPATIENT)
Dept: PHYSICAL THERAPY | Facility: MEDICAL CENTER | Age: 33
End: 2023-12-18
Attending: ORTHOPAEDIC SURGERY
Payer: COMMERCIAL

## 2023-12-18 DIAGNOSIS — S86.012A TORN ACHILLES TENDON, LEFT, INITIAL ENCOUNTER: Primary | ICD-10-CM

## 2023-12-18 PROCEDURE — 97140 MANUAL THERAPY 1/> REGIONS: CPT | Performed by: PHYSICAL THERAPIST

## 2023-12-18 PROCEDURE — 97110 THERAPEUTIC EXERCISES: CPT | Performed by: PHYSICAL THERAPIST

## 2023-12-18 NOTE — PROGRESS NOTES
"Daily Note     Today's date: 2023  Patient name: Pj Vasquez  : 1990  MRN: [id number]  Referring provider: Lachman, James R, MD  Dx:   Encounter Diagnosis     ICD-10-CM    1. Torn Achilles tendon, left, initial encounter  S86.012A                      Subjective: Patient states that he feels good.      Objective: See treatment diary below.      Assessment:  Patient demonstrates good tolerance to progressions without pain.  Tolerated treatment well. Patient would benefit from continued PT.      Plan: Continue per plan of care.      Precautions: DOS 23 R Achilles Tendon Repair    Manuals         Ankle PROM  AZ AZ AZ AZ AZ        TC mobs AZ AZ AZ AZ AZ                                  Neuro Re-Ed              SL Balance Alph 3x airex  Alph 3x airex           Eccentric Heel Raise 3x10 floor 3x12 floor 3x10 4\" 3x12 4\"                                                             Ther Ex             Bike 10'  10' 10' 10' 10'                     Ankle 4 way 30x black 30x black 30x black 30x black 30x black                     Seated HR 3x15 15# 3x15 15# 3x15 15# 3x15 15# 3x15 15#        Mini Squat             Leg Press 3x10 70# SL 3x12 70# SL 3x15 70# SL 3x10 80# SL 3x12 80# SL        Standing HR KE & KF 3x10 3x10 3x15 3x15 3x15        Standing DF 3x10 3x10 3x15 3x15 3x15        Ecc heel raises   3x10 up 2 down on 1 4\" 3x12 up 2 down on 1 4\" 3x15 up 2 down on 1 4\"        SL heel raises LP   2x10 30# 3x10 30# 3x12 30#        Ther Activity             Step ups 3x10 8\" 15# 3x10 8\" 15# 3x10 8\" 15# 3x12 8\" 15# 3x15 8\" 15#        Step Downs 3x10 6\" 3x10 6\" 3x10 6\" 3x12 6\" 3x15 6\"        Ball toss  30x airex 30x airex KE/KF 30x airex KE/KF 30x airex KE/KF                                  Modalities                                                        "

## 2024-01-03 ENCOUNTER — APPOINTMENT (OUTPATIENT)
Dept: PHYSICAL THERAPY | Facility: MEDICAL CENTER | Age: 34
End: 2024-01-03
Attending: ORTHOPAEDIC SURGERY
Payer: COMMERCIAL

## 2024-01-08 ENCOUNTER — APPOINTMENT (OUTPATIENT)
Dept: PHYSICAL THERAPY | Facility: MEDICAL CENTER | Age: 34
End: 2024-01-08
Attending: ORTHOPAEDIC SURGERY
Payer: COMMERCIAL

## 2024-01-15 ENCOUNTER — OFFICE VISIT (OUTPATIENT)
Dept: PHYSICAL THERAPY | Facility: MEDICAL CENTER | Age: 34
End: 2024-01-15
Attending: ORTHOPAEDIC SURGERY
Payer: COMMERCIAL

## 2024-01-15 DIAGNOSIS — S86.012A TORN ACHILLES TENDON, LEFT, INITIAL ENCOUNTER: Primary | ICD-10-CM

## 2024-01-15 PROCEDURE — 97530 THERAPEUTIC ACTIVITIES: CPT | Performed by: PHYSICAL THERAPIST

## 2024-01-15 PROCEDURE — 97110 THERAPEUTIC EXERCISES: CPT | Performed by: PHYSICAL THERAPIST

## 2024-01-15 NOTE — PROGRESS NOTES
"Daily Note     Today's date: 1/15/2024  Patient name: Pj Vasquez  : 1990  MRN: [id number]  Referring provider: Lachman, James R, MD  Dx:   Encounter Diagnosis     ICD-10-CM    1. Torn Achilles tendon, left, initial encounter  S86.012A                      Subjective: Patient states that he is doing well.      Objective: See treatment diary below.      Assessment:  Patient presents to PT after approximately 1 month d/t being sick.  He has not been able to perform workouts in the meantime.  Pt tolerated tx well without pain.  He continues c/ difficulty c/ single leg heel raises.  Tolerated treatment well. Patient would benefit from continued PT.      Plan: Continue per plan of care.      Precautions: DOS 23 R Achilles Tendon Repair    Manuals 11/30 12/4 12/7 12/11 12/18 1/15       Ankle PROM  AZ AZ AZ AZ AZ        TC mobs AZ AZ AZ AZ AZ                                  Neuro Re-Ed              SL Balance Alph 3x airex  Alph 3x airex           Eccentric Heel Raise 3x10 floor 3x12 floor 3x10 4\" 3x12 4\"                                                             Ther Ex             Bike 10'  10' 10' 10' 10' 10'       1/2 kneeling DF mobility      30x       Ankle 4 way 30x black 30x black 30x black 30x black 30x black 30x black                    Seated HR 3x15 15# 3x15 15# 3x15 15# 3x15 15# 3x15 15# 3x10 20#       Mini Squat             Leg Press 3x10 70# SL 3x12 70# SL 3x15 70# SL 3x10 80# SL 3x12 80# SL 3x15 80# SL       Standing HR KE & KF 3x10 3x10 3x15 3x15 3x15 3x10       Standing DF 3x10 3x10 3x15 3x15 3x15 3x10       Ecc heel raises   3x10 up 2 down on 1 4\" 3x12 up 2 down on 1 4\" 3x15 up 2 down on 1 4\" 3x10 up 2 down on 1 6\"       SL heel raises LP   2x10 30# 3x10 30# 3x12 30# 3x15 30#       Dynamic SL heel raise to running position      3x10       SL balance      Alph 3x airex       Ther Activity             Step ups 3x10 8\" 15# 3x10 8\" 15# 3x10 8\" 15# 3x12 8\" 15# 3x15 8\" 15#        Step Downs " "3x10 6\" 3x10 6\" 3x10 6\" 3x12 6\" 3x15 6\" 3x10 6\"       Ball toss  30x airex 30x airex KE/KF 30x airex KE/KF 30x airex KE/KF 30x airex KE/KF       Backwards walking TM      5' incline 2.0 speed 1.5                    Modalities                                                          "

## 2024-01-22 ENCOUNTER — OFFICE VISIT (OUTPATIENT)
Dept: PHYSICAL THERAPY | Facility: MEDICAL CENTER | Age: 34
End: 2024-01-22
Attending: ORTHOPAEDIC SURGERY
Payer: COMMERCIAL

## 2024-01-22 DIAGNOSIS — S86.012A TORN ACHILLES TENDON, LEFT, INITIAL ENCOUNTER: Primary | ICD-10-CM

## 2024-01-22 PROCEDURE — 97110 THERAPEUTIC EXERCISES: CPT | Performed by: PHYSICAL THERAPIST

## 2024-01-22 PROCEDURE — 97164 PT RE-EVAL EST PLAN CARE: CPT | Performed by: PHYSICAL THERAPIST

## 2024-01-22 PROCEDURE — 97530 THERAPEUTIC ACTIVITIES: CPT | Performed by: PHYSICAL THERAPIST

## 2024-01-22 NOTE — PROGRESS NOTES
Re-Eval    Today's date: 2024  Patient name: Pj Vasquez  : 1990  MRN: [id number]  Referring provider: Lachman, James R, MD  Dx:   Encounter Diagnosis     ICD-10-CM    1. Torn Achilles tendon, left, initial encounter  S86.012A                        Assessment  Assessment details: Pj Vasquez is a 33 y.o. male who has been compliant in attending physical therapy s/p left achilles tendon repair . Since initial evaluation he has demonstrated improvements in range of motion, strength, gait, and functional abilities. Strength is the primary limitation at this time affecting Pj's ability to function at prior level. He will continue to benefit from skilled physical therapy to address the current impairments and functional limitations to enable him to return to daily activities at maximal level. Thank you for the referral.    Impairments: abnormal gait, abnormal or restricted ROM, activity intolerance, impaired balance, impaired physical strength, lacks appropriate home exercise program, weight-bearing intolerance and poor posture     Understanding of Dx/Px/POC: good   Prognosis: good    Goals  STG  Patient will decrease pain at worst to 5/10 - MET  Patient will d/c cam boot - MET  Patient will demonstrate L ankle AROM WFL - MET  Patient will d/c crutches - MET  Patient will be independent with basic HEP - MET    LTG  Patient will decrease pain at worst to 1/10- MET  Patient will demonstrate left ankle strength 5/5 in all planes of motion- IN PROGRESS  Patient will report ability to ascend/descend steps in a reciprocal pattern- MET  Patient will be independent with comprehensive HEP- MET    Plan  Patient would benefit from: skilled physical therapy  Planned modality interventions: cryotherapy and thermotherapy: hydrocollator packs  Planned therapy interventions: manual therapy, neuromuscular re-education, patient education, therapeutic activities, therapeutic exercise, therapeutic training and home  exercise program  Frequency: 1-2x/ week  Duration in weeks: 8  Treatment plan discussed with: patient      Subjective Evaluation    History of Present Illness  Mechanism of injury: Pj is a 33 y.o. male presenting to physical therapy on 23 5 weeks s/p left achilles tendon repair. He mentions overall things have been going well. He mentions bathing and completing ADLs are challenging and requires compensations such as sitting down to get dressed. He mentions he was put in the CAM boot 2 weeks ago and has been using his crutches or scooter to get around. 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    -------------  11/15/23: patient reports things are going well thus far. He gives himself a 60-65% improvement in symptoms and functional abilities. He notes improvements in his ability to complete ADLs and household chores. He is no longer using any AD or the CAM boot for ambulation.   ------------    24:  Patient states that he feels good.  He was sore after lv, but it is because he hasn't been able to exercise while being sick.  He has no pain in the achilles.    Patient Goals  Patient goals for therapy: decreased pain, independence with ADLs/IADLs, increased strength, return to sport/leisure activities, increased motion and improved balance  Patient goal: be able to walk normally and hunt.  Pain  Current pain ratin    Objective:    Palpation: No TTP    L Ankle AROM:  Dorsiflexion: 12 degrees  Plantarflexion: 50 degrees  Inversion: 40 degrees  Eversion: 20 degrees    R Ankle AROM:  Dorsiflexion: 12 degrees  Plantarflexion: 45 degrees  Inversion: 30 degrees  Eversion: 15 degrees    Ankle Strength   Right:  Dorsiflexion: 5/5   Inversion: 5/5   Eversion: 5/5  Plantarflexion: 5/5    Left:  Dorsiflexion: 5/5   Inversion: 5/5   Eversion: 5/5  Plantarflexion: 3-/5    Balance: SL stance WNL bilaterally    Gait: WNL      Plan: Continue per plan of care.      Precautions: DOS 23 R  "Achilles Tendon Repair    Manuals 11/30 12/4 12/7 12/11 12/18 1/15 1/22      Ankle PROM  AZ AZ AZ AZ AZ        TC mobs AZ AZ AZ AZ AZ                                  Neuro Re-Ed              SL Balance Alph 3x airex  Alph 3x airex           Eccentric Heel Raise 3x10 floor 3x12 floor 3x10 4\" 3x12 4\"                                                             Ther Ex             Bike 10'  10' 10' 10' 10' 10' 10'      1/2 kneeling DF mobility      30x 30x      Ankle 4 way 30x black 30x black 30x black 30x black 30x black 30x black 30x black                   Seated HR 3x15 15# 3x15 15# 3x15 15# 3x15 15# 3x15 15# 3x10 20# 3x10 20#      Mini Squat             Leg Press 3x10 70# SL 3x12 70# SL 3x15 70# SL 3x10 80# SL 3x12 80# SL 3x15 80# SL 3x15 80#    SL      Standing HR KE & KF 3x10 3x10 3x15 3x15 3x15 3x10 3x10      Standing DF 3x10 3x10 3x15 3x15 3x15 3x10 3x10      Ecc heel raises   3x10 up 2 down on 1 4\" 3x12 up 2 down on 1 4\" 3x15 up 2 down on 1 4\" 3x10 up 2 down on 1 6\" 3x10 up 2 down on 1 6\"      SL heel raises LP   2x10 30# 3x10 30# 3x12 30# 3x15 30# 3x15 40#      Dynamic SL heel raise to running position      3x10 3x10      SL balance      Alph 3x airex Alph 3x airex       Ther Activity             Step ups 3x10 8\" 15# 3x10 8\" 15# 3x10 8\" 15# 3x12 8\" 15# 3x15 8\" 15#        Step Downs 3x10 6\" 3x10 6\" 3x10 6\" 3x12 6\" 3x15 6\" 3x10 6\" 3x10    6\"      Ball toss  30x airex 30x airex KE/KF 30x airex KE/KF 30x airex KE/KF 30x airex KE/KF 30x airex KE/KF      Backwards walking TM      5' incline 2.0 speed 1.5 5' incline 2.0 speed 1.5                   Modalities                                                            "

## 2024-02-01 ENCOUNTER — OFFICE VISIT (OUTPATIENT)
Dept: PHYSICAL THERAPY | Facility: MEDICAL CENTER | Age: 34
End: 2024-02-01
Attending: ORTHOPAEDIC SURGERY
Payer: COMMERCIAL

## 2024-02-01 DIAGNOSIS — S86.012A TORN ACHILLES TENDON, LEFT, INITIAL ENCOUNTER: Primary | ICD-10-CM

## 2024-02-01 PROCEDURE — 97530 THERAPEUTIC ACTIVITIES: CPT | Performed by: PHYSICAL THERAPIST

## 2024-02-01 PROCEDURE — 97110 THERAPEUTIC EXERCISES: CPT | Performed by: PHYSICAL THERAPIST

## 2024-02-01 NOTE — PROGRESS NOTES
"Daily Note     Today's date: 2024  Patient name: Pj Vasquez  : 1990  MRN: [id number]  Referring provider: Lachman, James R, MD  Dx:   Encounter Diagnosis     ICD-10-CM    1. Torn Achilles tendon, left, initial encounter  S86.012A                      Subjective:  Patient states that he is doing well.      Objective: See treatment diary below.      Assessment:  Patient demonstrates good tolerance to progressions without pain.  Tolerated treatment well. Patient would benefit from continued PT.      Plan: Continue per plan of care.      Precautions: DOS 23 R Achilles Tendon Repair    Manuals 11/30 12/4 12/7 12/11 12/18 1/15 1/22 2/1     Ankle PROM  AZ AZ AZ AZ AZ        TC mobs AZ AZ AZ AZ AZ                                  Neuro Re-Ed              SL Balance Alph 3x airex  Alph 3x airex           Eccentric Heel Raise 3x10 floor 3x12 floor 3x10 4\" 3x12 4\"                                                             Ther Ex             Bike 10'  10' 10' 10' 10' 10' 10' 10'     1/2 kneeling DF mobility      30x 30x 30x     Ankle 4 way 30x black 30x black 30x black 30x black 30x black 30x black 30x black 30x black                  Seated HR 3x15 15# 3x15 15# 3x15 15# 3x15 15# 3x15 15# 3x10 20# 3x10 20# 20, 15, 10 20#     Front squats        3x10 15# KB     Leg Press 3x10 70# SL 3x12 70# SL 3x15 70# SL 3x10 80# SL 3x12 80# SL 3x15 80# SL 3x15 80#    SL 3x10 85# SL     Standing HR KE & KF 3x10 3x10 3x15 3x15 3x15 3x10 3x10 np     Heel raises        3x10 10# DBs     Standing DF 3x10 3x10 3x15 3x15 3x15 3x10 3x10 np     Ecc heel raises   3x10 up 2 down on 1 4\" 3x12 up 2 down on 1 4\" 3x15 up 2 down on 1 4\" 3x10 up 2 down on 1 6\" 3x10 up 2 down on 1 6\" 3x12 up 2 down on 1 6\"     SL heel raises LP   2x10 30# 3x10 30# 3x12 30# 3x15 30# 3x15 40# 3x10 45#     Dynamic SL heel raise to running position      3x10 3x10 3x12     SL balance      Alph 3x airex Alph 3x airex  Alph 3x airex     Ecc box jumps           " "  Vertical jump             Broad jump                          Ther Activity             Step ups 3x10 8\" 15# 3x10 8\" 15# 3x10 8\" 15# 3x12 8\" 15# 3x15 8\" 15#        Step Downs 3x10 6\" 3x10 6\" 3x10 6\" 3x12 6\" 3x15 6\" 3x10 6\" 3x10    6\" 3x12 6\"     Ball toss  30x airex 30x airex KE/KF 30x airex KE/KF 30x airex KE/KF 30x airex KE/KF 30x airex KE/KF 30x airex KE/KF     Backwards walking TM      5' incline 2.0 speed 1.5 5' incline 2.0 speed 1.5 np     TM             Modalities                                                              "

## 2024-02-05 ENCOUNTER — OFFICE VISIT (OUTPATIENT)
Dept: PHYSICAL THERAPY | Facility: MEDICAL CENTER | Age: 34
End: 2024-02-05
Attending: ORTHOPAEDIC SURGERY
Payer: COMMERCIAL

## 2024-02-05 DIAGNOSIS — S86.012A TORN ACHILLES TENDON, LEFT, INITIAL ENCOUNTER: Primary | ICD-10-CM

## 2024-02-05 PROCEDURE — 97530 THERAPEUTIC ACTIVITIES: CPT | Performed by: PHYSICAL THERAPIST

## 2024-02-05 PROCEDURE — 97110 THERAPEUTIC EXERCISES: CPT | Performed by: PHYSICAL THERAPIST

## 2024-02-05 NOTE — PROGRESS NOTES
"Daily Note     Today's date: 2024  Patient name: Pj Vasquez  : 1990  MRN: [id number]  Referring provider: Lachman, James R, MD  Dx:   Encounter Diagnosis     ICD-10-CM    1. Torn Achilles tendon, left, initial encounter  S86.012A                      Subjective:  Patient states that he is doing well.      Objective: See treatment diary below.      Assessment:  Patient demonstrates good tolerance to progressions without pain.  Tolerated treatment well. Patient would benefit from continued PT.      Plan: Continue per plan of care.      Precautions: DOS 23 R Achilles Tendon Repair    Manuals 11/30 12/4 12/7 12/11 12/18 1/15 1/22 2/1 2/5    Ankle PROM  AZ AZ AZ AZ AZ        TC mobs AZ AZ AZ AZ AZ                                  Neuro Re-Ed              SL Balance Alph 3x airex  Alph 3x airex           Eccentric Heel Raise 3x10 floor 3x12 floor 3x10 4\" 3x12 4\"                                                             Ther Ex             Bike 10'  10' 10' 10' 10' 10' 10' 10' 10'    1/2 kneeling DF mobility      30x 30x 30x 30x    Ankle 4 way 30x black 30x black 30x black 30x black 30x black 30x black 30x black 30x black 30x black    Dynamic warm-up: -Inchworms -Standing gastroc mobility   -Standing soleus mobility         10x ea    Seated HR 3x15 15# 3x15 15# 3x15 15# 3x15 15# 3x15 15# 3x10 20# 3x10 20# 20, 15, 10 20# 20,   15,   10   20#    Front squats        3x10 15# KB 3x10 15# KB    Leg Press 3x10 70# SL 3x12 70# SL 3x15 70# SL 3x10 80# SL 3x12 80# SL 3x15 80# SL 3x15 80#    SL 3x10 85# SL 3x10 85# SL    Standing HR KE & KF 3x10 3x10 3x15 3x15 3x15 3x10 3x10 np     Heel raises        3x10 10# DBs 3x10 10# DBs    Standing DF 3x10 3x10 3x15 3x15 3x15 3x10 3x10 np     Ecc heel raises   3x10 up 2 down on 1 4\" 3x12 up 2 down on 1 4\" 3x15 up 2 down on 1 4\" 3x10 up 2 down on 1 6\" 3x10 up 2 down on 1 6\" 3x12 up 2 down on 1 6\" 3x15 up 2 down on 1 6\"    SL heel raises LP   2x10 30# 3x10 30# 3x12 30# 3x15 " Please see wound care instructions which have been provided separately.      "30# 3x15 40# 3x10 45# 3x10 45#    Dynamic SL heel raise to running position      3x10 3x10 3x12 3x15    SL balance      Alph 3x airex Alph 3x airex  Alph 3x airex     Ecc box jumps             Vertical jump             Broad jump                          Ther Activity             Step ups 3x10 8\" 15# 3x10 8\" 15# 3x10 8\" 15# 3x12 8\" 15# 3x15 8\" 15#        Step Downs 3x10 6\" 3x10 6\" 3x10 6\" 3x12 6\" 3x15 6\" 3x10 6\" 3x10    6\" 3x12 6\" 3x15 6\"    Ball toss  30x airex 30x airex KE/KF 30x airex KE/KF 30x airex KE/KF 30x airex KE/KF 30x airex KE/KF 30x airex KE/KF 30x airex KE/KF    Backwards walking TM      5' incline 2.0 speed 1.5 5' incline 2.0 speed 1.5 np     TM         1' on/  1' off x8 mins    Modalities                                                                "

## 2024-02-12 ENCOUNTER — OFFICE VISIT (OUTPATIENT)
Dept: PHYSICAL THERAPY | Facility: MEDICAL CENTER | Age: 34
End: 2024-02-12
Attending: ORTHOPAEDIC SURGERY
Payer: COMMERCIAL

## 2024-02-12 DIAGNOSIS — S86.012A TORN ACHILLES TENDON, LEFT, INITIAL ENCOUNTER: Primary | ICD-10-CM

## 2024-02-12 PROCEDURE — 97530 THERAPEUTIC ACTIVITIES: CPT | Performed by: PHYSICAL THERAPIST

## 2024-02-12 PROCEDURE — 97110 THERAPEUTIC EXERCISES: CPT | Performed by: PHYSICAL THERAPIST

## 2024-02-12 NOTE — PROGRESS NOTES
"Daily Note     Today's date: 2024  Patient name: Pj Vasquez  : 1990  MRN: [id number]  Referring provider: Lachman, James R, MD  Dx:   Encounter Diagnosis     ICD-10-CM    1. Torn Achilles tendon, left, initial encounter  S86.012A                      Subjective:  Patient states that he is doing well.      Objective: See treatment diary below.      Assessment:  Patient demonstrates good tolerance to progressions without pain.  Tolerated treatment well. Patient would benefit from continued PT.      Plan: Continue per plan of care.      Precautions: DOS 23 R Achilles Tendon Repair    Manuals 11/30 12/4 12/7 12/11 12/18 1/15 1/22 2/1 2/5 2/12   Ankle PROM  AZ AZ AZ AZ AZ        TC mobs AZ AZ AZ AZ AZ                                  Neuro Re-Ed              SL Balance Alph 3x airex  Alph 3x airex           Eccentric Heel Raise 3x10 floor 3x12 floor 3x10 4\" 3x12 4\"                                                             Ther Ex             Bike 10'  10' 10' 10' 10' 10' 10' 10' 10' 10'   1/2 kneeling DF mobility      30x 30x 30x 30x 30x   Ankle 4 way 30x black 30x black 30x black 30x black 30x black 30x black 30x black 30x black 30x black 30x black   Dynamic warm-up: -Inchworms -Standing gastroc mobility   -Standing soleus mobility         10x ea 10x ea   Seated HR 3x15 15# 3x15 15# 3x15 15# 3x15 15# 3x15 15# 3x10 20# 3x10 20# 20, 15, 10 20# 20,   15,   10   20# 20, 15, 10 20# KB   Front squats        3x10 15# KB 3x10 15# KB 3x10 20# KB   Leg Press 3x10 70# SL 3x12 70# SL 3x15 70# SL 3x10 80# SL 3x12 80# SL 3x15 80# SL 3x15 80#    SL 3x10 85# SL 3x10 85# SL 3x10 90# SL   Standing HR KE & KF 3x10 3x10 3x15 3x15 3x15 3x10 3x10 np     Heel raises        3x10 10# DBs 3x10 10# DBs 3x10 15# DBs   Standing DF 3x10 3x10 3x15 3x15 3x15 3x10 3x10 np     Ecc heel raises   3x10 up 2 down on 1 4\" 3x12 up 2 down on 1 4\" 3x15 up 2 down on 1 4\" 3x10 up 2 down on 1 6\" 3x10 up 2 down on 1 6\" 3x12 up 2 down on 1 6\" " "3x15 up 2 down on 1 6\" 3x15 up 2 down on 1 6\"   SL heel raises LP   2x10 30# 3x10 30# 3x12 30# 3x15 30# 3x15 40# 3x10 45# 3x10 45# 3x10 45#   Dynamic SL heel raise to running position      3x10 3x10 3x12 3x15 3x15   SL balance      Alph 3x airex Alph 3x airex  Alph 3x airex     Ecc box jumps             Vertical jump             Broad jump                          Ther Activity             Step ups 3x10 8\" 15# 3x10 8\" 15# 3x10 8\" 15# 3x12 8\" 15# 3x15 8\" 15#        Step Downs 3x10 6\" 3x10 6\" 3x10 6\" 3x12 6\" 3x15 6\" 3x10 6\" 3x10    6\" 3x12 6\" 3x15 6\" 3x15 6\"   Ball toss  30x airex 30x airex KE/KF 30x airex KE/KF 30x airex KE/KF 30x airex KE/KF 30x airex KE/KF 30x airex KE/KF 30x airex KE/KF 30x airex KE/KF   Backwards walking TM      5' incline 2.0 speed 1.5 5' incline 2.0 speed 1.5 np     TM         1' on/  1' off x8 mins 1' on/ 1' off x10 mins   Modalities                                                                  "

## 2024-02-19 ENCOUNTER — OFFICE VISIT (OUTPATIENT)
Dept: PHYSICAL THERAPY | Facility: MEDICAL CENTER | Age: 34
End: 2024-02-19
Attending: ORTHOPAEDIC SURGERY
Payer: COMMERCIAL

## 2024-02-19 DIAGNOSIS — S86.012A TORN ACHILLES TENDON, LEFT, INITIAL ENCOUNTER: Primary | ICD-10-CM

## 2024-02-19 PROCEDURE — 97530 THERAPEUTIC ACTIVITIES: CPT | Performed by: PHYSICAL THERAPIST

## 2024-02-19 PROCEDURE — 97110 THERAPEUTIC EXERCISES: CPT | Performed by: PHYSICAL THERAPIST

## 2024-02-19 NOTE — PROGRESS NOTES
"Daily Note     Today's date: 2024  Patient name: Pj Vasquez  : 1990  MRN: [id number]  Referring provider: Lachman, James R, MD  Dx:   Encounter Diagnosis     ICD-10-CM    1. Torn Achilles tendon, left, initial encounter  S86.012A                      Subjective: Patient states that he is doing well.      Objective: See treatment diary below.      Assessment:  Patient demonstrates 5/5 strength and good tolerance to exercise progressions without pain.  Tolerated treatment well. Patient would benefit from continued PT.      Plan: Continue per plan of care.      Precautions: DOS 23 R Achilles Tendon Repair    Manuals                                                                 Neuro Re-Ed                                                    Ther Ex             Bike 10'              kneeling DF mobility 30x            Ankle 4 way 30x black            Dynamic warm-up: -Inchworms -Standing gastroc mobility   -Standing soleus mobility 10x ea            Seated HR 20, 15, 10 20# KB            Front squats 3x10 20# KB            Leg Press 3x10 100# SL            Heel raises 3x10 15# DBs                         Ecc heel raises 3x15 up 2 down on 1 6\"            SL heel raises LP 3x10 45#            Dynamic SL heel raise to running position 3x15            SL balance 30x ball toss KE/KF            Ecc box jumps             Vertical jump             Broad jump                          Ther Activity             TM 2' on/ 1' off x3            Modalities                                                                    "

## 2024-02-20 ENCOUNTER — OFFICE VISIT (OUTPATIENT)
Dept: OBGYN CLINIC | Facility: CLINIC | Age: 34
End: 2024-02-20
Payer: COMMERCIAL

## 2024-02-20 VITALS
HEIGHT: 70 IN | HEART RATE: 72 BPM | BODY MASS INDEX: 34.36 KG/M2 | SYSTOLIC BLOOD PRESSURE: 132 MMHG | DIASTOLIC BLOOD PRESSURE: 91 MMHG | WEIGHT: 240 LBS

## 2024-02-20 DIAGNOSIS — S86.012S RUPTURE OF LEFT ACHILLES TENDON, SEQUELA: Primary | ICD-10-CM

## 2024-02-20 PROCEDURE — 99213 OFFICE O/P EST LOW 20 MIN: CPT | Performed by: ORTHOPAEDIC SURGERY

## 2024-02-20 RX ORDER — METHYLPREDNISOLONE 4 MG/1
4 TABLET ORAL
COMMUNITY
Start: 2024-01-03

## 2024-02-20 NOTE — PROGRESS NOTES
James R Lachman, M.D.  Attending, Orthopaedic Surgery  Foot and Ankle  [facility]      ORTHOPAEDIC FOOT AND ANKLE CLINIC VISIT     Assessment:     Encounter Diagnosis   Name Primary?    Rupture of left Achilles tendon, sequela Yes            Plan:   The patient verbalized understanding of exam findings and treatment plan. We engaged in the shared decision-making process and treatment options were discussed at length with the patient. Surgical and conservative management discussed today along with risks and benefits.  6 months s/p left achilles tendon repair  Continue with physical therapy, transition to [facility] when appropriate   Patient may begin to resume sport activity   Return if symptoms worsen or fail to improve.      History of Present Illness:   Chief Complaint:   Chief Complaint   Patient presents with    Follow-up     Left ankle. Everything going good with it.      Pj Vasquez is a 33 y.o. male who is being seen in follow-up for left achilles tendon repair, 8/22/2023. When we last saw he we recommended WBAT.  Pain is controlled, patient has begun resuming ADL's with out issue or complication. He notes mild discomfort at the achilles insertion. Patient is still attending physical therapy.       Orthopedic Surgical History:   See below    Past Medical, Surgical and Social History:  Past Medical History:  has a past medical history of COVID-19 (01/2021), Gout, and Kidney stone.  Problem List: does not have any pertinent problems on file.  Past Surgical History:  has a past surgical history that includes [place] tooth extraction (N/A); Dental surgery; and pr repair primary open/prq ruptured achilles tendon (Left, 8/24/2023).  Family History: family history is not on file.  Social History:  reports that he has quit smoking. His smoking use included cigarettes. He has never used smokeless tobacco. He reports current alcohol use of about 5.0 standard drinks of alcohol per week. He reports  "current drug use. Drug: Marijuana.  Current Medications: has a current medication list which includes the following prescription(s): allopurinol, ibuprofen, indomethacin, methylprednisolone, rivaroxaban, ondansetron, and oxycodone.  Allergies: has No Known Allergies.     Review of Systems:  General- denies fever/chills  HEENT- denies hearing loss or sore throat  Eyes- denies eye pain or visual disturbances, denies red eyes  Respiratory- denies cough or SOB  Cardio- denies chest pain or palpitations  GI- denies abdominal pain  Endocrine- denies urinary frequency  Urinary- denies pain with urination  Musculoskeletal- Negative except noted above  Skin- denies rashes or wounds  Neurological- denies dizziness or headache  Psychiatric- denies anxiety or difficulty concentrating    Physical Exam:   /91   Pulse 72   Ht 5' 10\" (1.778 m)   Wt 109 kg (240 lb)   BMI 34.44 kg/m²   General/Constitutional: No apparent distress: well-nourished and well developed.  Eyes: normal ocular motion  Lymphatic: No appreciable lymphadenopathy  Respiratory: Non-labored breathing  Vascular: No edema, swelling or tenderness, except as noted in detailed exam.  Integumentary: No impressive skin lesions present, except as noted in detailed exam.  Neuro: No ataxia or tremors noted  Psych: Normal mood and affect, oriented to person, place and time. Appropriate affect.  Musculoskeletal: Normal, except as noted in detailed exam and in HPI.    Examination    Left    Gait Normal   Musculoskeletal NTTP    Skin Normal.      Nails Normal    Range of Motion  WNL    Stability Stable    Muscle Strength 5/5 tibialis anterior  5/5 gastrocnemius-soleus  5/5 posterior tibialis  5/5 peroneal/eversion strength  5/5 EHL  5/5 FHL    Neurologic Normal    Sensation  Intact to light touch throughout sural, saphenous, superficial peroneal, deep peroneal and medial/lateral plantar nerve distributions.  [place]-Chris 5.07 filament (10g) testing  deferred.  "   Cardiovascular Brisk capillary refill < 2 seconds,intact DP and PT pulses    Special Tests None      Imaging Studies:   No new imaging        James R. Lachman, MD  Foot & Ankle Surgery   Department of Orthopaedic Surgery  [facility]      I personally performed the service.    James R. Lachman, MD    Scribe Attestation      I,:  Nohelia Floridalma am acting as a scribe while in the presence of the attending physician.:       I,:  James R Lachman, MD personally performed the services described in this documentation    as scribed in my presence.:

## 2024-03-04 ENCOUNTER — APPOINTMENT (OUTPATIENT)
Dept: PHYSICAL THERAPY | Facility: MEDICAL CENTER | Age: 34
End: 2024-03-04
Attending: ORTHOPAEDIC SURGERY
Payer: COMMERCIAL

## 2024-03-18 ENCOUNTER — APPOINTMENT (OUTPATIENT)
Dept: PHYSICAL THERAPY | Facility: MEDICAL CENTER | Age: 34
End: 2024-03-18
Attending: ORTHOPAEDIC SURGERY
Payer: COMMERCIAL

## 2024-03-25 ENCOUNTER — OFFICE VISIT (OUTPATIENT)
Dept: PHYSICAL THERAPY | Facility: MEDICAL CENTER | Age: 34
End: 2024-03-25
Attending: ORTHOPAEDIC SURGERY
Payer: COMMERCIAL

## 2024-03-25 DIAGNOSIS — Z98.890 S/P ACHILLES TENDON REPAIR: Primary | ICD-10-CM

## 2024-03-25 PROCEDURE — 97530 THERAPEUTIC ACTIVITIES: CPT | Performed by: PHYSICAL THERAPIST

## 2024-03-25 PROCEDURE — 97110 THERAPEUTIC EXERCISES: CPT | Performed by: PHYSICAL THERAPIST

## 2024-03-25 NOTE — PROGRESS NOTES
"Daily Note     Today's date: 3/25/2024  Patient name: Pj Vasquez  : 1990  MRN: [id number]  Referring provider: Lachman, James R, MD  Dx:   Encounter Diagnosis     ICD-10-CM    1. S/P Achilles tendon repair  Z98.890                      Subjective: Patient states that he feels good.      Objective: See treatment diary below.      Assessment:  Pj Vasquez has been compliant with attending PT and home exercise program since initial eval.  Pj  has made improvements in objective data since initial evalulation and has achieved all goals.  Patient reports having returned to their prior level or function. Patient provided with updated Home Exercise Program, all questions answered, verbalized understanding and agreement to plan of care. Thus it was mutually decided to discontinue this episode of care and transition to Home Exercise Program.      Plan:  d/c to HEP.     Precautions: DOS 23 R Achilles Tendon Repair    Manuals 2/19 3/25                                                               Neuro Re-Ed                                                    Ther Ex             Bike 10'  10'            kneeling DF mobility 30x 30x           Ankle 4 way 30x black 30x black           Dynamic warm-up: -Inchworms -Standing gastroc mobility   -Standing soleus mobility 10x ea 10x ea           Seated HR 20, 15, 10 20# KB 3x15 20# KB           Front squats 3x10 20# KB 3x10 20# KB           Leg Press 3x10 100# SL 3x10 100# SL           Heel raises 3x10 15# DBs 3x10 15# DBs                        Ecc heel raises 3x15 up 2 down on 1 6\" 3x15 up 2 down on 1 6\"           SL heel raises LP 3x10 45# 3x10 45#           Dynamic SL heel raise to running position 3x15 3x15           SL balance 30x ball toss KE/KF 30x ball toss KE/ KF           Ecc box jumps             Vertical jump             Broad jump                          Ther Activity             TM 2' on/ 1' off x3 1' on/ 1' off x5           Modalities           "

## (undated) DEVICE — CAST PADDING 6 IN SYNTHETIC STRL

## (undated) DEVICE — SUT VICRYL 2-0 CT-2 18 IN J726D

## (undated) DEVICE — GLOVE SRG BIOGEL 8

## (undated) DEVICE — BANDAGE, ESMARK LF STR 6"X9' (20/CS): Brand: CYPRESS

## (undated) DEVICE — BRUSH EZ SCRUB PCMX W/NAIL CLEANER

## (undated) DEVICE — PENCIL ELECTROSURG E-Z CLEAN -0035H

## (undated) DEVICE — CUFF TOURNIQUET 30 X 4 IN QUICK CONNECT DISP 1BLA

## (undated) DEVICE — CAST PADDING 6IN UNSTERILE

## (undated) DEVICE — 3M™ DURAPORE™ SURGICAL TAPE 1538-1, 1 INCH X 10 YARD (2,5CM X 9,1M), 12 ROLLS/BOX: Brand: 3M™ DURAPORE™

## (undated) DEVICE — MEDI-VAC YANKAUER SUCTION HANDLE W/STRAIGHT TIP & CONTROL VENT: Brand: CARDINAL HEALTH

## (undated) DEVICE — 3M™ MICROFOAM™ SURGICAL TAPE 4 ROLLS/CARTON 6 CARTONS/CASE 1528-3: Brand: 3M™ MICROFOAM™

## (undated) DEVICE — PADDING CAST 4 IN  COTTON STRL

## (undated) DEVICE — SPLINT 5 X 30 IN FAST SET PLASTER

## (undated) DEVICE — GLOVE SRG BIOGEL 7.5

## (undated) DEVICE — CHLORAPREP HI-LITE 26ML ORANGE

## (undated) DEVICE — INTENDED FOR TISSUE SEPARATION, AND OTHER PROCEDURES THAT REQUIRE A SHARP SURGICAL BLADE TO PUNCTURE OR CUT.: Brand: BARD-PARKER ® CARBON RIB-BACK BLADES

## (undated) DEVICE — DRAPE SHEET THREE QUARTER

## (undated) DEVICE — SUT VICRYL 0 CT-1 27 IN J260H

## (undated) DEVICE — GAUZE SPONGES,16 PLY: Brand: CURITY

## (undated) DEVICE — BETHLEHEM UNIVERSAL  MIONR EXT: Brand: CARDINAL HEALTH

## (undated) DEVICE — DRESSING XEROFORM 5 X 9

## (undated) DEVICE — SUT VICRYL 4-0 PS-2 18 IN J496G

## (undated) DEVICE — ACE WRAP 6 IN UNSTERILE

## (undated) DEVICE — GLOVE INDICATOR PI UNDERGLOVE SZ 8 BLUE

## (undated) DEVICE — SUT ETHILON 3-0 PS-1 18 IN 1663G

## (undated) DEVICE — ABDOMINAL PAD: Brand: DERMACEA